# Patient Record
Sex: FEMALE | Race: WHITE | NOT HISPANIC OR LATINO | Employment: OTHER | URBAN - METROPOLITAN AREA
[De-identification: names, ages, dates, MRNs, and addresses within clinical notes are randomized per-mention and may not be internally consistent; named-entity substitution may affect disease eponyms.]

---

## 2023-12-08 DIAGNOSIS — M25.551 PAIN IN RIGHT HIP: ICD-10-CM

## 2023-12-08 DIAGNOSIS — E55.9 VITAMIN D DEFICIENCY, UNSPECIFIED: Primary | ICD-10-CM

## 2023-12-12 ENCOUNTER — LAB (OUTPATIENT)
Dept: LAB | Facility: LAB | Age: 73
End: 2023-12-12
Payer: MEDICARE

## 2023-12-12 DIAGNOSIS — M25.551 PAIN IN RIGHT HIP: ICD-10-CM

## 2023-12-12 DIAGNOSIS — E55.9 VITAMIN D DEFICIENCY, UNSPECIFIED: ICD-10-CM

## 2023-12-12 DIAGNOSIS — E78.5 HYPERLIPIDEMIA, UNSPECIFIED: ICD-10-CM

## 2023-12-12 DIAGNOSIS — I34.1 NONRHEUMATIC MITRAL (VALVE) PROLAPSE: ICD-10-CM

## 2023-12-12 DIAGNOSIS — I25.10 ATHEROSCLEROTIC HEART DISEASE OF NATIVE CORONARY ARTERY WITHOUT ANGINA PECTORIS: Primary | ICD-10-CM

## 2023-12-12 LAB
ANION GAP SERPL CALC-SCNC: 15 MMOL/L (ref 10–20)
BUN SERPL-MCNC: 18 MG/DL (ref 6–23)
CALCIUM SERPL-MCNC: 9.7 MG/DL (ref 8.6–10.3)
CHLORIDE SERPL-SCNC: 95 MMOL/L (ref 98–107)
CHOLEST SERPL-MCNC: 149 MG/DL (ref 0–199)
CHOLESTEROL/HDL RATIO: 2.7
CO2 SERPL-SCNC: 30 MMOL/L (ref 21–32)
CREAT SERPL-MCNC: 0.65 MG/DL (ref 0.5–1.05)
GFR SERPL CREATININE-BSD FRML MDRD: >90 ML/MIN/1.73M*2
GLUCOSE SERPL-MCNC: 66 MG/DL (ref 74–99)
HDLC SERPL-MCNC: 56.2 MG/DL
LDLC SERPL CALC-MCNC: 70 MG/DL
MAGNESIUM SERPL-MCNC: 1.81 MG/DL (ref 1.6–2.4)
NON HDL CHOLESTEROL: 93 MG/DL (ref 0–149)
POTASSIUM SERPL-SCNC: 3.8 MMOL/L (ref 3.5–5.3)
SODIUM SERPL-SCNC: 136 MMOL/L (ref 136–145)
TRIGL SERPL-MCNC: 116 MG/DL (ref 0–149)
VLDL: 23 MG/DL (ref 0–40)

## 2023-12-12 PROCEDURE — 80061 LIPID PANEL: CPT

## 2023-12-12 PROCEDURE — 82306 VITAMIN D 25 HYDROXY: CPT

## 2023-12-12 PROCEDURE — 83735 ASSAY OF MAGNESIUM: CPT

## 2023-12-12 PROCEDURE — 80048 BASIC METABOLIC PNL TOTAL CA: CPT

## 2023-12-12 PROCEDURE — 36415 COLL VENOUS BLD VENIPUNCTURE: CPT

## 2023-12-13 LAB — 25(OH)D3 SERPL-MCNC: 71 NG/ML (ref 30–100)

## 2024-01-04 ENCOUNTER — ANCILLARY PROCEDURE (OUTPATIENT)
Dept: RADIOLOGY | Facility: CLINIC | Age: 74
End: 2024-01-04
Payer: MEDICARE

## 2024-01-04 ENCOUNTER — LAB (OUTPATIENT)
Dept: LAB | Facility: LAB | Age: 74
End: 2024-01-04
Payer: MEDICARE

## 2024-01-04 DIAGNOSIS — R53.83 OTHER FATIGUE: ICD-10-CM

## 2024-01-04 DIAGNOSIS — R06.02 SHORTNESS OF BREATH: ICD-10-CM

## 2024-01-04 DIAGNOSIS — Z87.440 PERSONAL HISTORY OF URINARY (TRACT) INFECTIONS: Primary | ICD-10-CM

## 2024-01-04 LAB
APPEARANCE UR: ABNORMAL
BACTERIA #/AREA URNS AUTO: ABNORMAL /HPF
BASOPHILS # BLD AUTO: 0.09 X10*3/UL (ref 0–0.1)
BASOPHILS NFR BLD AUTO: 0.7 %
BILIRUB UR STRIP.AUTO-MCNC: NEGATIVE MG/DL
CAOX CRY #/AREA UR COMP ASSIST: ABNORMAL /HPF
COLOR UR: ABNORMAL
EOSINOPHIL # BLD AUTO: 0.43 X10*3/UL (ref 0–0.4)
EOSINOPHIL NFR BLD AUTO: 3.5 %
ERYTHROCYTE [DISTWIDTH] IN BLOOD BY AUTOMATED COUNT: 13 % (ref 11.5–14.5)
GLUCOSE UR STRIP.AUTO-MCNC: NEGATIVE MG/DL
HCT VFR BLD AUTO: 50.8 % (ref 36–46)
HGB BLD-MCNC: 16.3 G/DL (ref 12–16)
IMM GRANULOCYTES # BLD AUTO: 0.04 X10*3/UL (ref 0–0.5)
IMM GRANULOCYTES NFR BLD AUTO: 0.3 % (ref 0–0.9)
KETONES UR STRIP.AUTO-MCNC: NEGATIVE MG/DL
LEUKOCYTE ESTERASE UR QL STRIP.AUTO: ABNORMAL
LYMPHOCYTES # BLD AUTO: 1.86 X10*3/UL (ref 0.8–3)
LYMPHOCYTES NFR BLD AUTO: 15.1 %
MCH RBC QN AUTO: 30.9 PG (ref 26–34)
MCHC RBC AUTO-ENTMCNC: 32.1 G/DL (ref 32–36)
MCV RBC AUTO: 96 FL (ref 80–100)
MONOCYTES # BLD AUTO: 1.06 X10*3/UL (ref 0.05–0.8)
MONOCYTES NFR BLD AUTO: 8.6 %
MUCOUS THREADS #/AREA URNS AUTO: ABNORMAL /LPF
NEUTROPHILS # BLD AUTO: 8.87 X10*3/UL (ref 1.6–5.5)
NEUTROPHILS NFR BLD AUTO: 71.8 %
NITRITE UR QL STRIP.AUTO: NEGATIVE
NRBC BLD-RTO: 0 /100 WBCS (ref 0–0)
PH UR STRIP.AUTO: 5 [PH]
PLATELET # BLD AUTO: 321 X10*3/UL (ref 150–450)
PROT UR STRIP.AUTO-MCNC: NEGATIVE MG/DL
RBC # BLD AUTO: 5.27 X10*6/UL (ref 4–5.2)
RBC # UR STRIP.AUTO: NEGATIVE /UL
RBC #/AREA URNS AUTO: ABNORMAL /HPF
SP GR UR STRIP.AUTO: 1.02
UROBILINOGEN UR STRIP.AUTO-MCNC: <2 MG/DL
WBC # BLD AUTO: 12.4 X10*3/UL (ref 4.4–11.3)
WBC #/AREA URNS AUTO: ABNORMAL /HPF

## 2024-01-04 PROCEDURE — 81001 URINALYSIS AUTO W/SCOPE: CPT

## 2024-01-04 PROCEDURE — 84443 ASSAY THYROID STIM HORMONE: CPT

## 2024-01-04 PROCEDURE — 85025 COMPLETE CBC W/AUTO DIFF WBC: CPT

## 2024-01-04 PROCEDURE — 71046 X-RAY EXAM CHEST 2 VIEWS: CPT

## 2024-01-04 PROCEDURE — 36415 COLL VENOUS BLD VENIPUNCTURE: CPT

## 2024-01-04 PROCEDURE — 71046 X-RAY EXAM CHEST 2 VIEWS: CPT | Performed by: RADIOLOGY

## 2024-01-04 PROCEDURE — 80053 COMPREHEN METABOLIC PANEL: CPT

## 2024-01-05 LAB
ALBUMIN SERPL BCP-MCNC: 4 G/DL (ref 3.4–5)
ALP SERPL-CCNC: 63 U/L (ref 33–136)
ALT SERPL W P-5'-P-CCNC: 25 U/L (ref 7–45)
ANION GAP SERPL CALC-SCNC: 15 MMOL/L (ref 10–20)
AST SERPL W P-5'-P-CCNC: 25 U/L (ref 9–39)
BILIRUB SERPL-MCNC: 1.1 MG/DL (ref 0–1.2)
BUN SERPL-MCNC: 18 MG/DL (ref 6–23)
CALCIUM SERPL-MCNC: 9.3 MG/DL (ref 8.6–10.3)
CHLORIDE SERPL-SCNC: 101 MMOL/L (ref 98–107)
CO2 SERPL-SCNC: 30 MMOL/L (ref 21–32)
CREAT SERPL-MCNC: 0.74 MG/DL (ref 0.5–1.05)
GFR SERPL CREATININE-BSD FRML MDRD: 86 ML/MIN/1.73M*2
GLUCOSE SERPL-MCNC: 134 MG/DL (ref 74–99)
POTASSIUM SERPL-SCNC: 4.2 MMOL/L (ref 3.5–5.3)
PROT SERPL-MCNC: 6 G/DL (ref 6.4–8.2)
SODIUM SERPL-SCNC: 142 MMOL/L (ref 136–145)
TSH SERPL-ACNC: 2.26 MIU/L (ref 0.44–3.98)

## 2024-01-08 ENCOUNTER — HOSPITAL ENCOUNTER (OUTPATIENT)
Dept: CARDIOLOGY | Facility: HOSPITAL | Age: 74
Discharge: HOME | End: 2024-01-08
Payer: MEDICARE

## 2024-01-08 ENCOUNTER — HOSPITAL ENCOUNTER (EMERGENCY)
Facility: HOSPITAL | Age: 74
Discharge: HOME | End: 2024-01-08
Attending: EMERGENCY MEDICINE
Payer: MEDICARE

## 2024-01-08 ENCOUNTER — APPOINTMENT (OUTPATIENT)
Dept: RADIOLOGY | Facility: HOSPITAL | Age: 74
End: 2024-01-08
Payer: MEDICARE

## 2024-01-08 VITALS
SYSTOLIC BLOOD PRESSURE: 109 MMHG | RESPIRATION RATE: 20 BRPM | WEIGHT: 136 LBS | OXYGEN SATURATION: 94 % | HEART RATE: 90 BPM | HEIGHT: 62 IN | BODY MASS INDEX: 25.03 KG/M2 | TEMPERATURE: 100.2 F | DIASTOLIC BLOOD PRESSURE: 74 MMHG

## 2024-01-08 DIAGNOSIS — J18.9 ATYPICAL PNEUMONIA: ICD-10-CM

## 2024-01-08 DIAGNOSIS — J44.1 COPD EXACERBATION (MULTI): ICD-10-CM

## 2024-01-08 DIAGNOSIS — R06.02 SHORTNESS OF BREATH: Primary | ICD-10-CM

## 2024-01-08 LAB
ALBUMIN SERPL-MCNC: 4.2 G/DL (ref 3.5–5)
ALP BLD-CCNC: 71 U/L (ref 35–125)
ALT SERPL-CCNC: 23 U/L (ref 5–40)
ANION GAP SERPL CALC-SCNC: 11 MMOL/L
AST SERPL-CCNC: 30 U/L (ref 5–40)
BASOPHILS # BLD AUTO: 0.03 X10*3/UL (ref 0–0.1)
BASOPHILS NFR BLD AUTO: 0.4 %
BILIRUB SERPL-MCNC: 1.2 MG/DL (ref 0.1–1.2)
BUN SERPL-MCNC: 14 MG/DL (ref 8–25)
CALCIUM SERPL-MCNC: 9.6 MG/DL (ref 8.5–10.4)
CHLORIDE SERPL-SCNC: 97 MMOL/L (ref 97–107)
CO2 SERPL-SCNC: 28 MMOL/L (ref 24–31)
CREAT SERPL-MCNC: 0.7 MG/DL (ref 0.4–1.6)
EGFRCR SERPLBLD CKD-EPI 2021: >90 ML/MIN/1.73M*2
EOSINOPHIL # BLD AUTO: 0.12 X10*3/UL (ref 0–0.4)
EOSINOPHIL NFR BLD AUTO: 1.8 %
ERYTHROCYTE [DISTWIDTH] IN BLOOD BY AUTOMATED COUNT: 13.5 % (ref 11.5–14.5)
FLUAV RNA RESP QL NAA+PROBE: NOT DETECTED
FLUBV RNA RESP QL NAA+PROBE: NOT DETECTED
GLUCOSE SERPL-MCNC: 108 MG/DL (ref 65–99)
HCT VFR BLD AUTO: 48.5 % (ref 36–46)
HGB BLD-MCNC: 15.9 G/DL (ref 12–16)
IMM GRANULOCYTES # BLD AUTO: 0.02 X10*3/UL (ref 0–0.5)
IMM GRANULOCYTES NFR BLD AUTO: 0.3 % (ref 0–0.9)
LYMPHOCYTES # BLD AUTO: 0.52 X10*3/UL (ref 0.8–3)
LYMPHOCYTES NFR BLD AUTO: 7.7 %
MCH RBC QN AUTO: 30.8 PG (ref 26–34)
MCHC RBC AUTO-ENTMCNC: 32.8 G/DL (ref 32–36)
MCV RBC AUTO: 94 FL (ref 80–100)
MONOCYTES # BLD AUTO: 1.04 X10*3/UL (ref 0.05–0.8)
MONOCYTES NFR BLD AUTO: 15.4 %
NEUTROPHILS # BLD AUTO: 5.04 X10*3/UL (ref 1.6–5.5)
NEUTROPHILS NFR BLD AUTO: 74.4 %
NRBC BLD-RTO: 0 /100 WBCS (ref 0–0)
NT-PROBNP SERPL-MCNC: 122 PG/ML (ref 0–353)
PLATELET # BLD AUTO: 242 X10*3/UL (ref 150–450)
POTASSIUM SERPL-SCNC: 3.8 MMOL/L (ref 3.4–5.1)
PROT SERPL-MCNC: 6.7 G/DL (ref 5.9–7.9)
RBC # BLD AUTO: 5.16 X10*6/UL (ref 4–5.2)
RSV RNA RESP QL NAA+PROBE: NOT DETECTED
SARS-COV-2 RNA RESP QL NAA+PROBE: NOT DETECTED
SODIUM SERPL-SCNC: 136 MMOL/L (ref 133–145)
TROPONIN T SERPL-MCNC: 10 NG/L
TROPONIN T SERPL-MCNC: 9 NG/L
WBC # BLD AUTO: 6.8 X10*3/UL (ref 4.4–11.3)

## 2024-01-08 PROCEDURE — 36415 COLL VENOUS BLD VENIPUNCTURE: CPT | Performed by: PHYSICIAN ASSISTANT

## 2024-01-08 PROCEDURE — 71046 X-RAY EXAM CHEST 2 VIEWS: CPT

## 2024-01-08 PROCEDURE — 80053 COMPREHEN METABOLIC PANEL: CPT | Performed by: PHYSICIAN ASSISTANT

## 2024-01-08 PROCEDURE — 85025 COMPLETE CBC W/AUTO DIFF WBC: CPT | Performed by: PHYSICIAN ASSISTANT

## 2024-01-08 PROCEDURE — 94640 AIRWAY INHALATION TREATMENT: CPT | Mod: 59

## 2024-01-08 PROCEDURE — 84484 ASSAY OF TROPONIN QUANT: CPT | Performed by: PHYSICIAN ASSISTANT

## 2024-01-08 PROCEDURE — 83880 ASSAY OF NATRIURETIC PEPTIDE: CPT | Performed by: PHYSICIAN ASSISTANT

## 2024-01-08 PROCEDURE — 99284 EMERGENCY DEPT VISIT MOD MDM: CPT | Performed by: EMERGENCY MEDICINE

## 2024-01-08 PROCEDURE — 96374 THER/PROPH/DIAG INJ IV PUSH: CPT | Performed by: EMERGENCY MEDICINE

## 2024-01-08 PROCEDURE — 2500000002 HC RX 250 W HCPCS SELF ADMINISTERED DRUGS (ALT 637 FOR MEDICARE OP, ALT 636 FOR OP/ED): Performed by: PHYSICIAN ASSISTANT

## 2024-01-08 PROCEDURE — 87637 SARSCOV2&INF A&B&RSV AMP PRB: CPT | Performed by: PHYSICIAN ASSISTANT

## 2024-01-08 PROCEDURE — 2500000004 HC RX 250 GENERAL PHARMACY W/ HCPCS (ALT 636 FOR OP/ED): Performed by: PHYSICIAN ASSISTANT

## 2024-01-08 PROCEDURE — 93005 ELECTROCARDIOGRAM TRACING: CPT

## 2024-01-08 RX ORDER — ACETAMINOPHEN 325 MG/1
650 TABLET ORAL ONCE
Status: COMPLETED | OUTPATIENT
Start: 2024-01-08 | End: 2024-01-08

## 2024-01-08 RX ORDER — AZITHROMYCIN 250 MG/1
250 TABLET, FILM COATED ORAL DAILY
Qty: 6 TABLET | Refills: 0 | Status: SHIPPED | OUTPATIENT
Start: 2024-01-08 | End: 2024-01-13

## 2024-01-08 RX ORDER — METHYLPREDNISOLONE 4 MG/1
TABLET ORAL
Qty: 21 TABLET | Refills: 0 | Status: SHIPPED | OUTPATIENT
Start: 2024-01-08 | End: 2024-01-15

## 2024-01-08 RX ORDER — ALBUTEROL SULFATE 90 UG/1
2 AEROSOL, METERED RESPIRATORY (INHALATION) EVERY 4 HOURS PRN
Qty: 18 G | Refills: 0 | Status: SHIPPED | OUTPATIENT
Start: 2024-01-08 | End: 2024-02-27

## 2024-01-08 RX ORDER — ALBUTEROL SULFATE 90 UG/1
2 AEROSOL, METERED RESPIRATORY (INHALATION) EVERY 4 HOURS PRN
Qty: 18 G | Refills: 0 | Status: SHIPPED | OUTPATIENT
Start: 2024-01-08 | End: 2024-02-07

## 2024-01-08 RX ORDER — IPRATROPIUM BROMIDE AND ALBUTEROL SULFATE 2.5; .5 MG/3ML; MG/3ML
3 SOLUTION RESPIRATORY (INHALATION) ONCE
Status: COMPLETED | OUTPATIENT
Start: 2024-01-08 | End: 2024-01-08

## 2024-01-08 RX ADMIN — IPRATROPIUM BROMIDE AND ALBUTEROL SULFATE 3 ML: 2.5; .5 SOLUTION RESPIRATORY (INHALATION) at 14:43

## 2024-01-08 RX ADMIN — METHYLPREDNISOLONE SODIUM SUCCINATE 125 MG: 125 INJECTION, POWDER, FOR SOLUTION INTRAMUSCULAR; INTRAVENOUS at 14:39

## 2024-01-08 RX ADMIN — ACETAMINOPHEN 650 MG: 325 TABLET ORAL at 14:39

## 2024-01-08 ASSESSMENT — HEART SCORE
RISK FACTORS: >2 RISK FACTORS OR HX OF ATHEROSCLEROTIC DISEASE
HEART SCORE: 4
ECG: NORMAL
HISTORY: SLIGHTLY SUSPICIOUS
AGE: 65+
TROPONIN: LESS THAN OR EQUAL TO NORMAL LIMIT

## 2024-01-08 ASSESSMENT — COLUMBIA-SUICIDE SEVERITY RATING SCALE - C-SSRS
2. HAVE YOU ACTUALLY HAD ANY THOUGHTS OF KILLING YOURSELF?: NO
6. HAVE YOU EVER DONE ANYTHING, STARTED TO DO ANYTHING, OR PREPARED TO DO ANYTHING TO END YOUR LIFE?: NO
1. IN THE PAST MONTH, HAVE YOU WISHED YOU WERE DEAD OR WISHED YOU COULD GO TO SLEEP AND NOT WAKE UP?: NO

## 2024-01-08 ASSESSMENT — PAIN - FUNCTIONAL ASSESSMENT: PAIN_FUNCTIONAL_ASSESSMENT: 0-10

## 2024-01-08 NOTE — ED PROVIDER NOTES
HPI   Chief Complaint   Patient presents with    Shortness of Breath     2 night before I started having cramping under my lt breast and I became more sob        73-year-old female presented emergency department with a chief complaint of left-sided chest discomfort, shortness of breath, productive cough.  Symptoms have been over a month but acutely worse in the last several days.  At the beginning of December she was on steroids and antibiotics.  She subsequently had some improvement but has now worsened.  She does have a history of coronary artery disease follows with Dr. Reaves.  Nontoxic-appearing.  Patient is febrile on arrival.  No other complaint.                          [place] Coma Scale Score: 15                  Patient History   No past medical history on file.  Past Surgical History:   Procedure Laterality Date    OTHER SURGICAL HISTORY  2022    Appendectomy    OTHER SURGICAL HISTORY  2022     section    OTHER SURGICAL HISTORY  2022    Hernia repair    OTHER SURGICAL HISTORY  2022    Wrist surgery     No family history on file.  Social History     Tobacco Use    Smoking status: Not on file    Smokeless tobacco: Not on file   Substance Use Topics    Alcohol use: Not on file    Drug use: Not on file       Physical Exam   ED Triage Vitals [24 1402]   Temp Heart Rate Resp BP   37.9 °C (100.2 °F) 90 20 109/74      SpO2 Temp Source Heart Rate Source Patient Position   94 % Oral Monitor Sitting      BP Location FiO2 (%)     Left arm --       Physical Exam  Vitals and nursing note reviewed.   Constitutional:       Appearance: She is well-developed.   HENT:      Head: Normocephalic and atraumatic.   Cardiovascular:      Rate and Rhythm: Normal rate and regular rhythm.   Pulmonary:      Comments: Crackles left lower lobe, diffuse wheeze  Musculoskeletal:         General: Normal range of motion.      Cervical back: Normal range of motion.   Skin:     General: Skin is  dry.   Neurological:      General: No focal deficit present.      Mental Status: She is alert.   Psychiatric:         Mood and Affect: Mood normal.         ED Course & MDM   Diagnoses as of 01/08/24 1831   Shortness of breath   COPD exacerbation (CMS/[facility])   Atypical pneumonia       Medical Decision Making  I have seen and evaluated this patient.  The attending physician has also seen and evaluated this patient.  Vital signs, laboratory testing and diagnostic images if applicable have been reviewed.  All laboratory and imaging is interpreted by myself unless otherwise stated.  Radiology studies are also formally interpreted by radiologist.    CBC without significant leukocytosis or anemia, metabolic panel without significant renal impairment or electrolyte abnormality.  Troponin within an appropriate range without significant delta change, chest x-ray without actionable cardiopulmonary process, EKG without evidence of acute ischemia.  Patient does have an elevated heart score of 4.  Although her symptoms are somewhat atypical of acute coronary syndrome and most consistent with infectious etiology.  Patient given option of admission to the hospital.  Prefers outpatient treatment.  Will give cardiology pulmonology referral.  Started on antibiotic for atypical pneumonia and provide short course of steroid.  Released in good condition.    Labs Reviewed  COMPREHENSIVE METABOLIC PANEL - Abnormal     Glucose                       108 (*)                Sodium                        136                    Potassium                     3.8                    Chloride                      97                     Bicarbonate                   28                     Urea Nitrogen                 14                     Creatinine                    0.70                   eGFR                          >90                    Calcium                       9.6                    Albumin                       4.2                     Alkaline Phosphatase          71                     Total Protein                 6.7                    AST                           30                     Bilirubin, Total              1.2                    ALT                           23                     Anion Gap                     11                  CBC WITH AUTO DIFFERENTIAL - Abnormal     WBC                           6.8                    nRBC                          0.0                    RBC                           5.16                   Hemoglobin                    15.9                   Hematocrit                    48.5 (*)               MCV                           94                     MCH                           30.8                   MCHC                          32.8                   RDW                           13.5                   Platelets                     242                    Neutrophils %                 74.4                   Immature Granulocytes %, Automated   0.3                    Lymphocytes %                 7.7                    Monocytes %                   15.4                   Eosinophils %                 1.8                    Basophils %                   0.4                    Neutrophils Absolute          5.04                   Immature Granulocytes Absolute, Au*   0.02                   Lymphocytes Absolute          0.52 (*)               Monocytes Absolute            1.04 (*)               Eosinophils Absolute          0.12                   Basophils Absolute            0.03                N-TERMINAL PROBNP - Normal     PROBNP                        122                        Narrative: Reference ranges are based on clinical submission data. These ranges represent the 95th percentile of normal cut-off points. As NT Pro- BNP values approach 1000 pg/ml, clinical symptoms are more likely associated with CHF.  SARS-COV-2 AND INFLUENZA A/B PCR - Normal     Flu A Result                                          Flu B Result                                         Coronavirus 2019, PCR                                    Narrative: This assay has received FDA Emergency Use Authorization (EUA) and  is only authorized for the duration of time that circumstances exist to justify the authorization of the emergency use of in vitro diagnostic tests for the detection of SARS-CoV-2 virus and/or diagnosis of COVID-19 infection under section 564(b)(1) of the Act, 21 U.S.C. 360bbb-3(b)(1). Testing for SARS-CoV-2 is only recommended for patients who meet current clinical and/or epidemiological criteria as defined by federal, state, or local public health directives. This assay is an in vitro diagnostic nucleic acid amplification test for the qualitative detection of SARS-CoV-2, Influenza A, and Influenza B from nasopharyngeal specimens and has been validated for use at [facility]. Negative results do not preclude COVID-19 infections or Influenza A/B infections, and should not be used as the sole basis for diagnosis, treatment, or other management decisions. If Influenza A/B and RSV PCR results are negative, testing for Parainfluenza virus, Adenovirus and Metapneumovirus is routinely performed for [facility] pediatric oncology and intensive care inpatients, and is available on other patients by placing an add-on request.   RSV PCR - Normal     RSV PCR                                                  Narrative: This assay is an FDA-cleared, in vitro diagnostic nucleic acid amplification test for the detection of RSV from nasopharyngeal specimens, and has been validated for use at [facility]. Negative results do not preclude RSV infections, and should not be used as the sole basis for diagnosis, treatment, or other management decisions. If Influenza A/B and RSV PCR results are negative, testing for Parainfluenza virus, Adenovirus and Metapneumovirus is routinely performed for pediatric  oncology and intensive care inpatients at [facility], and is available on other patients by placing an add-on request.                                      SERIAL TROPONIN, INITIAL ([facility]) - Normal     Troponin T, High Sensitivity   10                  SERIAL TROPONIN,  2 HOUR ([facility]) - Normal     Troponin T, High Sensitivity   9                   TROPONIN T SERIES, HIGH SENSITIVITY (0, 2 HR, 6 HR)         Narrative: The following orders were created for panel order Troponin T Series, High Sensitivity (0, 2HR, 6HR).                  Procedure                               Abnormality         Status                                     ---------                               -----------         ------                                     Serial Troponin, Initial...[[id number]]  Normal              Final result                               Serial Troponin, 2 Hour ...[[id number]]  Normal              Final result                                                 Please view results for these tests on the individual orders.  URINALYSIS WITH REFLEX CULTURE AND MICROSCOPIC         Narrative: The following orders were created for panel order Urinalysis with Reflex Culture and Microscopic.                  Procedure                               Abnormality         Status                                     ---------                               -----------         ------                                     Urinalysis with Reflex C...[[id number]]                                                                 Extra Urine Gray Tube[[id number]]                                                                                         Please view results for these tests on the individual orders.  URINALYSIS WITH REFLEX CULTURE AND MICROSCOPIC  EXTRA URINE GRAY TUBE    XR chest 2 views   Final Result    Hyperinflation. No acute cardiopulmonary process. No significant    interval change from 01/04/2024                Signed by: Chivo Frazier  1/8/2024 4:20 PM    Dictation workstation:   [id number]        Your medication list      START taking these medications      Instructions Last Dose Given Next Dose Due   albuterol 90 mcg/actuation inhaler      Inhale 2 puffs every 4 hours if needed for wheezing.       azithromycin 250 mg tablet  Commonly known as: Zithromax Z-Kvng      Take 1 tablet (250 mg) by mouth once daily for 5 days.       methylPREDNISolone 4 mg tablets  Commonly known as: Medrol Dospak      Follow schedule on package instructions             Where to Get Your Medications      These medications were sent to [facility]/pharmacy #[id number] - [place], [place] - [address]   [facility] AT [address], [place] [id number]    Phone: [phone]   albuterol 90 mcg/actuation inhaler  azithromycin 250 mg tablet  methylPREDNISolone 4 mg tablets             Procedure  Procedures     Damian Cotton PA-C  01/08/24 1723

## 2024-01-24 ENCOUNTER — LAB (OUTPATIENT)
Dept: LAB | Facility: LAB | Age: 74
End: 2024-01-24
Payer: MEDICARE

## 2024-01-24 DIAGNOSIS — J20.8 ACUTE BRONCHITIS DUE TO OTHER SPECIFIED ORGANISMS: ICD-10-CM

## 2024-01-24 DIAGNOSIS — J20.8 ACUTE BRONCHITIS DUE TO OTHER SPECIFIED ORGANISMS: Primary | ICD-10-CM

## 2024-01-24 LAB
BACTERIA SPEC RESP CULT: ABNORMAL
BASOPHILS # BLD AUTO: 0.06 X10*3/UL (ref 0–0.1)
BASOPHILS NFR BLD AUTO: 0.7 %
EOSINOPHIL # BLD AUTO: 0.23 X10*3/UL (ref 0–0.4)
EOSINOPHIL NFR BLD AUTO: 2.7 %
ERYTHROCYTE [DISTWIDTH] IN BLOOD BY AUTOMATED COUNT: 13.7 % (ref 11.5–14.5)
GRAM STN SPEC: ABNORMAL
HCT VFR BLD AUTO: 48.5 % (ref 36–46)
HGB BLD-MCNC: 15.7 G/DL (ref 12–16)
IMM GRANULOCYTES # BLD AUTO: 0.03 X10*3/UL (ref 0–0.5)
IMM GRANULOCYTES NFR BLD AUTO: 0.4 % (ref 0–0.9)
LYMPHOCYTES # BLD AUTO: 1.63 X10*3/UL (ref 0.8–3)
LYMPHOCYTES NFR BLD AUTO: 19.2 %
MCH RBC QN AUTO: 30.8 PG (ref 26–34)
MCHC RBC AUTO-ENTMCNC: 32.4 G/DL (ref 32–36)
MCV RBC AUTO: 95 FL (ref 80–100)
MONOCYTES # BLD AUTO: 0.76 X10*3/UL (ref 0.05–0.8)
MONOCYTES NFR BLD AUTO: 9 %
NEUTROPHILS # BLD AUTO: 5.78 X10*3/UL (ref 1.6–5.5)
NEUTROPHILS NFR BLD AUTO: 68 %
NRBC BLD-RTO: 0 /100 WBCS (ref 0–0)
PLATELET # BLD AUTO: 349 X10*3/UL (ref 150–450)
PROCALCITONIN SERPL-MCNC: 0.02 NG/ML
RBC # BLD AUTO: 5.09 X10*6/UL (ref 4–5.2)
WBC # BLD AUTO: 8.5 X10*3/UL (ref 4.4–11.3)

## 2024-01-24 PROCEDURE — 87205 SMEAR GRAM STAIN: CPT | Mod: WESLAB | Performed by: HOSPITALIST

## 2024-01-24 PROCEDURE — 85025 COMPLETE CBC W/AUTO DIFF WBC: CPT

## 2024-01-24 PROCEDURE — 36415 COLL VENOUS BLD VENIPUNCTURE: CPT

## 2024-01-24 PROCEDURE — 84145 PROCALCITONIN (PCT): CPT

## 2024-02-20 LAB
ATRIAL RATE: 103 BPM
P AXIS: 76 DEGREES
P OFFSET: 197 MS
P ONSET: 147 MS
PR INTERVAL: 156 MS
Q ONSET: 225 MS
QRS COUNT: 16 BEATS
QRS DURATION: 82 MS
QT INTERVAL: 330 MS
QTC CALCULATION(BAZETT): 432 MS
QTC FREDERICIA: 395 MS
R AXIS: 80 DEGREES
T AXIS: 79 DEGREES
T OFFSET: 390 MS
VENTRICULAR RATE: 103 BPM

## 2024-02-22 ENCOUNTER — LAB (OUTPATIENT)
Dept: LAB | Facility: LAB | Age: 74
End: 2024-02-22
Payer: MEDICARE

## 2024-02-22 DIAGNOSIS — J44.9 CHRONIC OBSTRUCTIVE PULMONARY DISEASE, UNSPECIFIED (MULTI): ICD-10-CM

## 2024-02-22 DIAGNOSIS — I25.10 ATHEROSCLEROTIC HEART DISEASE OF NATIVE CORONARY ARTERY WITHOUT ANGINA PECTORIS: Primary | ICD-10-CM

## 2024-02-22 DIAGNOSIS — R00.1 BRADYCARDIA, UNSPECIFIED: ICD-10-CM

## 2024-02-22 LAB
ALBUMIN SERPL BCP-MCNC: 4.4 G/DL (ref 3.4–5)
ALP SERPL-CCNC: 60 U/L (ref 33–136)
ALT SERPL W P-5'-P-CCNC: 23 U/L (ref 7–45)
ANION GAP SERPL CALC-SCNC: 18 MMOL/L (ref 10–20)
AST SERPL W P-5'-P-CCNC: 26 U/L (ref 9–39)
BASOPHILS # BLD AUTO: 0.08 X10*3/UL (ref 0–0.1)
BASOPHILS NFR BLD AUTO: 0.9 %
BILIRUB SERPL-MCNC: 1.4 MG/DL (ref 0–1.2)
BUN SERPL-MCNC: 16 MG/DL (ref 6–23)
CALCIUM SERPL-MCNC: 9.4 MG/DL (ref 8.6–10.3)
CHLORIDE SERPL-SCNC: 100 MMOL/L (ref 98–107)
CO2 SERPL-SCNC: 31 MMOL/L (ref 21–32)
CREAT SERPL-MCNC: 0.66 MG/DL (ref 0.5–1.05)
EGFRCR SERPLBLD CKD-EPI 2021: >90 ML/MIN/1.73M*2
EOSINOPHIL # BLD AUTO: 0.22 X10*3/UL (ref 0–0.4)
EOSINOPHIL NFR BLD AUTO: 2.6 %
ERYTHROCYTE [DISTWIDTH] IN BLOOD BY AUTOMATED COUNT: 13.6 % (ref 11.5–14.5)
GLUCOSE SERPL-MCNC: 89 MG/DL (ref 74–99)
HCT VFR BLD AUTO: 50.8 % (ref 36–46)
HGB BLD-MCNC: 16.1 G/DL (ref 12–16)
IMM GRANULOCYTES # BLD AUTO: 0.02 X10*3/UL (ref 0–0.5)
IMM GRANULOCYTES NFR BLD AUTO: 0.2 % (ref 0–0.9)
LYMPHOCYTES # BLD AUTO: 2.07 X10*3/UL (ref 0.8–3)
LYMPHOCYTES NFR BLD AUTO: 24.4 %
MAGNESIUM SERPL-MCNC: 2.02 MG/DL (ref 1.6–2.4)
MCH RBC QN AUTO: 31 PG (ref 26–34)
MCHC RBC AUTO-ENTMCNC: 31.7 G/DL (ref 32–36)
MCV RBC AUTO: 98 FL (ref 80–100)
MONOCYTES # BLD AUTO: 0.63 X10*3/UL (ref 0.05–0.8)
MONOCYTES NFR BLD AUTO: 7.4 %
NEUTROPHILS # BLD AUTO: 5.47 X10*3/UL (ref 1.6–5.5)
NEUTROPHILS NFR BLD AUTO: 64.5 %
NRBC BLD-RTO: 0 /100 WBCS (ref 0–0)
PLATELET # BLD AUTO: 319 X10*3/UL (ref 150–450)
POTASSIUM SERPL-SCNC: 4.2 MMOL/L (ref 3.5–5.3)
PROT SERPL-MCNC: 6.6 G/DL (ref 6.4–8.2)
RBC # BLD AUTO: 5.2 X10*6/UL (ref 4–5.2)
SODIUM SERPL-SCNC: 145 MMOL/L (ref 136–145)
WBC # BLD AUTO: 8.5 X10*3/UL (ref 4.4–11.3)

## 2024-02-22 PROCEDURE — 82103 ALPHA-1-ANTITRYPSIN TOTAL: CPT

## 2024-02-22 PROCEDURE — 36415 COLL VENOUS BLD VENIPUNCTURE: CPT

## 2024-02-22 PROCEDURE — 80053 COMPREHEN METABOLIC PANEL: CPT

## 2024-02-22 PROCEDURE — 83735 ASSAY OF MAGNESIUM: CPT

## 2024-02-22 PROCEDURE — 85025 COMPLETE CBC W/AUTO DIFF WBC: CPT

## 2024-02-23 LAB — A1AT SERPL NEPH-MCNC: 161 MG/DL (ref 84–218)

## 2024-02-27 ENCOUNTER — APPOINTMENT (OUTPATIENT)
Dept: CARDIOLOGY | Facility: HOSPITAL | Age: 74
End: 2024-02-27
Payer: MEDICARE

## 2024-02-27 ENCOUNTER — HOSPITAL ENCOUNTER (OUTPATIENT)
Facility: HOSPITAL | Age: 74
Setting detail: OBSERVATION
Discharge: HOME | End: 2024-02-28
Attending: INTERNAL MEDICINE | Admitting: INTERNAL MEDICINE
Payer: MEDICARE

## 2024-02-27 DIAGNOSIS — R07.89 OTHER CHEST PAIN: Primary | ICD-10-CM

## 2024-02-27 DIAGNOSIS — Z03.89 CORONARY ARTERY DISEASE (CAD) EXCLUDED: ICD-10-CM

## 2024-02-27 DIAGNOSIS — I20.0 UNSTABLE ANGINA (MULTI): ICD-10-CM

## 2024-02-27 LAB
ACT BLD: 255 SEC (ref 89–169)
ACT BLD: 328 SEC (ref 89–169)

## 2024-02-27 PROCEDURE — 2500000002 HC RX 250 W HCPCS SELF ADMINISTERED DRUGS (ALT 637 FOR MEDICARE OP, ALT 636 FOR OP/ED): Mod: MUE | Performed by: INTERNAL MEDICINE

## 2024-02-27 PROCEDURE — C1725 CATH, TRANSLUMIN NON-LASER: HCPCS | Performed by: INTERNAL MEDICINE

## 2024-02-27 PROCEDURE — 85347 COAGULATION TIME ACTIVATED: CPT

## 2024-02-27 PROCEDURE — 2500000004 HC RX 250 GENERAL PHARMACY W/ HCPCS (ALT 636 FOR OP/ED): Performed by: INTERNAL MEDICINE

## 2024-02-27 PROCEDURE — 2550000001 HC RX 255 CONTRASTS: Performed by: INTERNAL MEDICINE

## 2024-02-27 PROCEDURE — 93458 L HRT ARTERY/VENTRICLE ANGIO: CPT | Mod: 59 | Performed by: INTERNAL MEDICINE

## 2024-02-27 PROCEDURE — 7100000010 HC PHASE TWO TIME - EACH INCREMENTAL 1 MINUTE: Performed by: INTERNAL MEDICINE

## 2024-02-27 PROCEDURE — 93010 ELECTROCARDIOGRAM REPORT: CPT | Performed by: INTERNAL MEDICINE

## 2024-02-27 PROCEDURE — G0269 OCCLUSIVE DEVICE IN VEIN ART: HCPCS | Mod: TC,59 | Performed by: INTERNAL MEDICINE

## 2024-02-27 PROCEDURE — 93005 ELECTROCARDIOGRAM TRACING: CPT

## 2024-02-27 PROCEDURE — C9600 PERC DRUG-EL COR STENT SING: HCPCS | Performed by: INTERNAL MEDICINE

## 2024-02-27 PROCEDURE — C1769 GUIDE WIRE: HCPCS | Performed by: INTERNAL MEDICINE

## 2024-02-27 PROCEDURE — C1760 CLOSURE DEV, VASC: HCPCS | Performed by: INTERNAL MEDICINE

## 2024-02-27 PROCEDURE — 99152 MOD SED SAME PHYS/QHP 5/>YRS: CPT | Performed by: INTERNAL MEDICINE

## 2024-02-27 PROCEDURE — C1887 CATHETER, GUIDING: HCPCS | Performed by: INTERNAL MEDICINE

## 2024-02-27 PROCEDURE — C1874 STENT, COATED/COV W/DEL SYS: HCPCS | Performed by: INTERNAL MEDICINE

## 2024-02-27 PROCEDURE — G0378 HOSPITAL OBSERVATION PER HR: HCPCS

## 2024-02-27 PROCEDURE — 2780000003 HC OR 278 NO HCPCS: Performed by: INTERNAL MEDICINE

## 2024-02-27 PROCEDURE — 2500000001 HC RX 250 WO HCPCS SELF ADMINISTERED DRUGS (ALT 637 FOR MEDICARE OP): Performed by: INTERNAL MEDICINE

## 2024-02-27 PROCEDURE — 99153 MOD SED SAME PHYS/QHP EA: CPT | Performed by: INTERNAL MEDICINE

## 2024-02-27 PROCEDURE — 2500000005 HC RX 250 GENERAL PHARMACY W/O HCPCS: Performed by: INTERNAL MEDICINE

## 2024-02-27 PROCEDURE — 7100000009 HC PHASE TWO TIME - INITIAL BASE CHARGE: Performed by: INTERNAL MEDICINE

## 2024-02-27 PROCEDURE — 2720000007 HC OR 272 NO HCPCS: Performed by: INTERNAL MEDICINE

## 2024-02-27 DEVICE — STENT ONYXNG30022UX ONYX 3.00X22RX
Type: IMPLANTABLE DEVICE | Site: HEART | Status: FUNCTIONAL
Brand: ONYX FRONTIER™

## 2024-02-27 RX ORDER — NITROGLYCERIN 0.3 MG/1
0.3 TABLET SUBLINGUAL EVERY 5 MIN PRN
COMMUNITY

## 2024-02-27 RX ORDER — ACETAMINOPHEN 325 MG/1
650 TABLET ORAL EVERY 6 HOURS PRN
Status: DISCONTINUED | OUTPATIENT
Start: 2024-02-27 | End: 2024-02-28 | Stop reason: HOSPADM

## 2024-02-27 RX ORDER — ATORVASTATIN CALCIUM 80 MG/1
80 TABLET, FILM COATED ORAL NIGHTLY
Status: DISCONTINUED | OUTPATIENT
Start: 2024-02-27 | End: 2024-02-28 | Stop reason: HOSPADM

## 2024-02-27 RX ORDER — NYSTATIN 100000 U/G
OINTMENT TOPICAL 2 TIMES DAILY
Status: DISCONTINUED | OUTPATIENT
Start: 2024-02-27 | End: 2024-02-28 | Stop reason: HOSPADM

## 2024-02-27 RX ORDER — LIDOCAINE HYDROCHLORIDE 10 MG/ML
INJECTION, SOLUTION EPIDURAL; INFILTRATION; INTRACAUDAL; PERINEURAL AS NEEDED
Status: DISCONTINUED | OUTPATIENT
Start: 2024-02-27 | End: 2024-02-27 | Stop reason: HOSPADM

## 2024-02-27 RX ORDER — LIDOCAINE HYDROCHLORIDE AND EPINEPHRINE 20; 10 MG/ML; UG/ML
3 INJECTION, SOLUTION INFILTRATION; PERINEURAL ONCE AS NEEDED
Status: DISCONTINUED | OUTPATIENT
Start: 2024-02-27 | End: 2024-02-28 | Stop reason: HOSPADM

## 2024-02-27 RX ORDER — SODIUM CHLORIDE 9 MG/ML
5 INJECTION, SOLUTION INTRAVENOUS CONTINUOUS
Status: DISCONTINUED | OUTPATIENT
Start: 2024-02-27 | End: 2024-02-27

## 2024-02-27 RX ORDER — IBANDRONATE SODIUM 150 MG/1
150 TABLET, FILM COATED ORAL
COMMUNITY

## 2024-02-27 RX ORDER — TRIAMTERENE/HYDROCHLOROTHIAZID 37.5-25 MG
1 TABLET ORAL DAILY
Status: DISCONTINUED | OUTPATIENT
Start: 2024-02-27 | End: 2024-02-28 | Stop reason: HOSPADM

## 2024-02-27 RX ORDER — NITROGLYCERIN 0.4 MG/1
0.4 TABLET SUBLINGUAL EVERY 5 MIN PRN
Status: DISCONTINUED | OUTPATIENT
Start: 2024-02-27 | End: 2024-02-28 | Stop reason: HOSPADM

## 2024-02-27 RX ORDER — PROMETHAZINE HYDROCHLORIDE 25 MG/1
25 TABLET ORAL EVERY 6 HOURS PRN
COMMUNITY

## 2024-02-27 RX ORDER — ATORVASTATIN CALCIUM 40 MG/1
40 TABLET, FILM COATED ORAL DAILY
COMMUNITY

## 2024-02-27 RX ORDER — PANTOPRAZOLE SODIUM 20 MG/1
20 TABLET, DELAYED RELEASE ORAL
Status: DISCONTINUED | OUTPATIENT
Start: 2024-02-28 | End: 2024-02-28 | Stop reason: HOSPADM

## 2024-02-27 RX ORDER — NITROGLYCERIN 40 MG/100ML
INJECTION INTRAVENOUS AS NEEDED
Status: DISCONTINUED | OUTPATIENT
Start: 2024-02-27 | End: 2024-02-27 | Stop reason: HOSPADM

## 2024-02-27 RX ORDER — ASPIRIN 81 MG/1
81 TABLET ORAL DAILY
Status: DISCONTINUED | OUTPATIENT
Start: 2024-02-27 | End: 2024-02-27

## 2024-02-27 RX ORDER — DEXTROSE MONOHYDRATE AND SODIUM CHLORIDE 5; .45 G/100ML; G/100ML
100 INJECTION, SOLUTION INTRAVENOUS CONTINUOUS
Status: DISCONTINUED | OUTPATIENT
Start: 2024-02-27 | End: 2024-02-28 | Stop reason: HOSPADM

## 2024-02-27 RX ORDER — ALBUTEROL SULFATE 0.83 MG/ML
3 SOLUTION RESPIRATORY (INHALATION) EVERY 4 HOURS PRN
Status: DISCONTINUED | OUTPATIENT
Start: 2024-02-27 | End: 2024-02-28 | Stop reason: HOSPADM

## 2024-02-27 RX ORDER — PROMETHAZINE HYDROCHLORIDE 25 MG/1
25 TABLET ORAL EVERY 6 HOURS PRN
Status: DISCONTINUED | OUTPATIENT
Start: 2024-02-27 | End: 2024-02-28 | Stop reason: HOSPADM

## 2024-02-27 RX ORDER — NYSTATIN 100000 U/G
OINTMENT TOPICAL 2 TIMES DAILY
COMMUNITY

## 2024-02-27 RX ORDER — MIDAZOLAM HYDROCHLORIDE 1 MG/ML
INJECTION, SOLUTION INTRAMUSCULAR; INTRAVENOUS AS NEEDED
Status: DISCONTINUED | OUTPATIENT
Start: 2024-02-27 | End: 2024-02-27 | Stop reason: HOSPADM

## 2024-02-27 RX ORDER — PROMETHAZINE HYDROCHLORIDE 25 MG/ML
INJECTION, SOLUTION INTRAMUSCULAR; INTRAVENOUS AS NEEDED
Status: DISCONTINUED | OUTPATIENT
Start: 2024-02-27 | End: 2024-02-27 | Stop reason: HOSPADM

## 2024-02-27 RX ORDER — FLUTICASONE PROPIONATE 50 MCG
1 SPRAY, SUSPENSION (ML) NASAL DAILY
COMMUNITY

## 2024-02-27 RX ORDER — ASPIRIN 81 MG/1
81 TABLET ORAL DAILY
COMMUNITY

## 2024-02-27 RX ORDER — CALCIUM CARBONATE 300MG(750)
400 TABLET,CHEWABLE ORAL DAILY
COMMUNITY

## 2024-02-27 RX ORDER — ESTRADIOL 0.1 MG/G
2 CREAM VAGINAL DAILY
Status: DISCONTINUED | OUTPATIENT
Start: 2024-02-27 | End: 2024-02-28 | Stop reason: HOSPADM

## 2024-02-27 RX ORDER — ERGOCALCIFEROL 1.25 MG/1
1250 CAPSULE ORAL
Status: DISCONTINUED | OUTPATIENT
Start: 2024-02-28 | End: 2024-02-28

## 2024-02-27 RX ORDER — METOPROLOL SUCCINATE 25 MG/1
25 TABLET, EXTENDED RELEASE ORAL DAILY
Status: DISCONTINUED | OUTPATIENT
Start: 2024-02-27 | End: 2024-02-28 | Stop reason: HOSPADM

## 2024-02-27 RX ORDER — ERGOCALCIFEROL 1.25 MG/1
1.25 CAPSULE ORAL
COMMUNITY

## 2024-02-27 RX ORDER — FENTANYL CITRATE 50 UG/ML
INJECTION, SOLUTION INTRAMUSCULAR; INTRAVENOUS AS NEEDED
Status: DISCONTINUED | OUTPATIENT
Start: 2024-02-27 | End: 2024-02-27 | Stop reason: HOSPADM

## 2024-02-27 RX ORDER — FLUTICASONE PROPIONATE 50 MCG
1 SPRAY, SUSPENSION (ML) NASAL DAILY
Status: DISCONTINUED | OUTPATIENT
Start: 2024-02-27 | End: 2024-02-28 | Stop reason: HOSPADM

## 2024-02-27 RX ORDER — NAPROXEN SODIUM 220 MG/1
81 TABLET, FILM COATED ORAL DAILY
Status: DISCONTINUED | OUTPATIENT
Start: 2024-02-27 | End: 2024-02-28 | Stop reason: HOSPADM

## 2024-02-27 RX ORDER — IODIXANOL 320 MG/ML
INJECTION, SOLUTION INTRAVASCULAR AS NEEDED
Status: DISCONTINUED | OUTPATIENT
Start: 2024-02-27 | End: 2024-02-27 | Stop reason: HOSPADM

## 2024-02-27 RX ORDER — ESTRADIOL 0.1 MG/G
2 CREAM VAGINAL DAILY
COMMUNITY

## 2024-02-27 RX ORDER — TRIAMTERENE AND HYDROCHLOROTHIAZIDE 37.5; 25 MG/1; MG/1
1 CAPSULE ORAL EVERY MORNING
COMMUNITY

## 2024-02-27 RX ORDER — PANTOPRAZOLE SODIUM 20 MG/1
20 TABLET, DELAYED RELEASE ORAL
COMMUNITY

## 2024-02-27 RX ORDER — ATORVASTATIN CALCIUM 40 MG/1
40 TABLET, FILM COATED ORAL NIGHTLY
Status: DISCONTINUED | OUTPATIENT
Start: 2024-02-27 | End: 2024-02-28

## 2024-02-27 RX ORDER — HEPARIN SODIUM 1000 [USP'U]/ML
INJECTION, SOLUTION INTRAVENOUS; SUBCUTANEOUS AS NEEDED
Status: DISCONTINUED | OUTPATIENT
Start: 2024-02-27 | End: 2024-02-27 | Stop reason: HOSPADM

## 2024-02-27 RX ADMIN — TICAGRELOR 90 MG: 90 TABLET ORAL at 23:38

## 2024-02-27 RX ADMIN — ASPIRIN 81 MG CHEWABLE TABLET 81 MG: 81 TABLET CHEWABLE at 17:19

## 2024-02-27 RX ADMIN — ACETAMINOPHEN 650 MG: 325 TABLET ORAL at 21:34

## 2024-02-27 RX ADMIN — ATORVASTATIN CALCIUM 80 MG: 40 TABLET, FILM COATED ORAL at 20:53

## 2024-02-27 SDOH — SOCIAL STABILITY: SOCIAL NETWORK: ARE YOU MARRIED, WIDOWED, DIVORCED, SEPARATED, NEVER MARRIED, OR LIVING WITH A PARTNER?: DIVORCED

## 2024-02-27 SDOH — ECONOMIC STABILITY: FOOD INSECURITY: WITHIN THE PAST 12 MONTHS, THE FOOD YOU BOUGHT JUST DIDN'T LAST AND YOU DIDN'T HAVE MONEY TO GET MORE.: NEVER TRUE

## 2024-02-27 SDOH — HEALTH STABILITY: MENTAL HEALTH
STRESS IS WHEN SOMEONE FEELS TENSE, NERVOUS, ANXIOUS, OR CAN'T SLEEP AT NIGHT BECAUSE THEIR MIND IS TROUBLED. HOW STRESSED ARE YOU?: ONLY A LITTLE

## 2024-02-27 SDOH — SOCIAL STABILITY: SOCIAL INSECURITY: WITHIN THE LAST YEAR, HAVE YOU BEEN AFRAID OF YOUR PARTNER OR EX-PARTNER?: NO

## 2024-02-27 SDOH — ECONOMIC STABILITY: INCOME INSECURITY: HOW HARD IS IT FOR YOU TO PAY FOR THE VERY BASICS LIKE FOOD, HOUSING, MEDICAL CARE, AND HEATING?: NOT HARD AT ALL

## 2024-02-27 SDOH — SOCIAL STABILITY: SOCIAL INSECURITY
WITHIN THE LAST YEAR, HAVE YOU BEEN KICKED, HIT, SLAPPED, OR OTHERWISE PHYSICALLY HURT BY YOUR PARTNER OR EX-PARTNER?: NO

## 2024-02-27 SDOH — ECONOMIC STABILITY: FOOD INSECURITY: WITHIN THE PAST 12 MONTHS, YOU WORRIED THAT YOUR FOOD WOULD RUN OUT BEFORE YOU GOT MONEY TO BUY MORE.: NEVER TRUE

## 2024-02-27 SDOH — ECONOMIC STABILITY: TRANSPORTATION INSECURITY
IN THE PAST 12 MONTHS, HAS THE LACK OF TRANSPORTATION KEPT YOU FROM MEDICAL APPOINTMENTS OR FROM GETTING MEDICATIONS?: NO

## 2024-02-27 SDOH — SOCIAL STABILITY: SOCIAL NETWORK
DO YOU BELONG TO ANY CLUBS OR ORGANIZATIONS SUCH AS CHURCH GROUPS UNIONS, FRATERNAL OR ATHLETIC GROUPS, OR SCHOOL GROUPS?: NO

## 2024-02-27 SDOH — SOCIAL STABILITY: SOCIAL INSECURITY: WITHIN THE LAST YEAR, HAVE YOU BEEN HUMILIATED OR EMOTIONALLY ABUSED IN OTHER WAYS BY YOUR PARTNER OR EX-PARTNER?: NO

## 2024-02-27 SDOH — SOCIAL STABILITY: SOCIAL NETWORK
IN A TYPICAL WEEK, HOW MANY TIMES DO YOU TALK ON THE PHONE WITH FAMILY, FRIENDS, OR NEIGHBORS?: MORE THAN THREE TIMES A WEEK

## 2024-02-27 SDOH — SOCIAL STABILITY: SOCIAL INSECURITY: WERE YOU ABLE TO COMPLETE ALL THE BEHAVIORAL HEALTH SCREENINGS?: YES

## 2024-02-27 SDOH — SOCIAL STABILITY: SOCIAL INSECURITY
WITHIN THE LAST YEAR, HAVE TO BEEN RAPED OR FORCED TO HAVE ANY KIND OF SEXUAL ACTIVITY BY YOUR PARTNER OR EX-PARTNER?: NO

## 2024-02-27 SDOH — ECONOMIC STABILITY: INCOME INSECURITY: IN THE LAST 12 MONTHS, WAS THERE A TIME WHEN YOU WERE NOT ABLE TO PAY THE MORTGAGE OR RENT ON TIME?: NO

## 2024-02-27 SDOH — ECONOMIC STABILITY: HOUSING INSECURITY
IN THE LAST 12 MONTHS, WAS THERE A TIME WHEN YOU DID NOT HAVE A STEADY PLACE TO SLEEP OR SLEPT IN A SHELTER (INCLUDING NOW)?: NO

## 2024-02-27 SDOH — SOCIAL STABILITY: SOCIAL INSECURITY: POSSIBLE ABUSE REPORTED TO:: ADVOCATE

## 2024-02-27 SDOH — SOCIAL STABILITY: SOCIAL INSECURITY: ARE YOU OR HAVE YOU BEEN THREATENED OR ABUSED PHYSICALLY, EMOTIONALLY, OR SEXUALLY BY ANYONE?: NO

## 2024-02-27 SDOH — HEALTH STABILITY: MENTAL HEALTH
EXPERIENCED ANY OF THE FOLLOWING LIFE EVENTS: SEXUAL ASSAULT;PHYSICAL ASSAULT;SOCIAL LOSS (BANKRUPTCY, DIVORCE, WORK-RELATED STRESS)

## 2024-02-27 SDOH — SOCIAL STABILITY: SOCIAL INSECURITY: DO YOU FEEL UNSAFE GOING BACK TO THE PLACE WHERE YOU ARE LIVING?: NO

## 2024-02-27 SDOH — SOCIAL STABILITY: SOCIAL INSECURITY: HAVE YOU HAD THOUGHTS OF HARMING ANYONE ELSE?: NO

## 2024-02-27 SDOH — HEALTH STABILITY: PHYSICAL HEALTH: ON AVERAGE, HOW MANY MINUTES DO YOU ENGAGE IN EXERCISE AT THIS LEVEL?: 10 MIN

## 2024-02-27 SDOH — SOCIAL STABILITY: SOCIAL INSECURITY: DOES ANYONE TRY TO KEEP YOU FROM HAVING/CONTACTING OTHER FRIENDS OR DOING THINGS OUTSIDE YOUR HOME?: NO

## 2024-02-27 SDOH — HEALTH STABILITY: PHYSICAL HEALTH: ON AVERAGE, HOW MANY DAYS PER WEEK DO YOU ENGAGE IN MODERATE TO STRENUOUS EXERCISE (LIKE A BRISK WALK)?: 2 DAYS

## 2024-02-27 SDOH — SOCIAL STABILITY: SOCIAL NETWORK: HOW OFTEN DO YOU ATTENT MEETINGS OF THE CLUB OR ORGANIZATION YOU BELONG TO?: NEVER

## 2024-02-27 SDOH — SOCIAL STABILITY: SOCIAL INSECURITY: HAS ANYONE EVER THREATENED TO HURT YOUR FAMILY OR YOUR PETS?: NO

## 2024-02-27 SDOH — SOCIAL STABILITY: SOCIAL NETWORK: HOW OFTEN DO YOU ATTEND CHURCH OR RELIGIOUS SERVICES?: NEVER

## 2024-02-27 SDOH — SOCIAL STABILITY: SOCIAL INSECURITY: DO YOU FEEL ANYONE HAS EXPLOITED OR TAKEN ADVANTAGE OF YOU FINANCIALLY OR OF YOUR PERSONAL PROPERTY?: NO

## 2024-02-27 SDOH — ECONOMIC STABILITY: HOUSING INSECURITY: IN THE LAST 12 MONTHS, HOW MANY PLACES HAVE YOU LIVED?: 1

## 2024-02-27 SDOH — SOCIAL STABILITY: SOCIAL NETWORK: HOW OFTEN DO YOU GET TOGETHER WITH FRIENDS OR RELATIVES?: MORE THAN THREE TIMES A WEEK

## 2024-02-27 SDOH — ECONOMIC STABILITY: TRANSPORTATION INSECURITY
IN THE PAST 12 MONTHS, HAS LACK OF TRANSPORTATION KEPT YOU FROM MEETINGS, WORK, OR FROM GETTING THINGS NEEDED FOR DAILY LIVING?: NO

## 2024-02-27 SDOH — SOCIAL STABILITY: SOCIAL INSECURITY: ARE THERE ANY APPARENT SIGNS OF INJURIES/BEHAVIORS THAT COULD BE RELATED TO ABUSE/NEGLECT?: NO

## 2024-02-27 SDOH — SOCIAL STABILITY: SOCIAL INSECURITY: ABUSE: ADULT

## 2024-02-27 ASSESSMENT — COGNITIVE AND FUNCTIONAL STATUS - GENERAL
DRESSING REGULAR LOWER BODY CLOTHING: A LITTLE
TOILETING: A LITTLE
WALKING IN HOSPITAL ROOM: A LITTLE
TURNING FROM BACK TO SIDE WHILE IN FLAT BAD: A LITTLE
STANDING UP FROM CHAIR USING ARMS: A LITTLE
MOBILITY SCORE: 18
DAILY ACTIVITIY SCORE: 20
PATIENT BASELINE BEDBOUND: NO
MOVING TO AND FROM BED TO CHAIR: A LITTLE
HELP NEEDED FOR BATHING: A LITTLE
DAILY ACTIVITIY SCORE: 24
CLIMB 3 TO 5 STEPS WITH RAILING: A LITTLE
EATING MEALS: A LITTLE
MOBILITY SCORE: 24
MOVING FROM LYING ON BACK TO SITTING ON SIDE OF FLAT BED WITH BEDRAILS: A LITTLE

## 2024-02-27 ASSESSMENT — PAIN SCALES - GENERAL
PAINLEVEL_OUTOF10: 3
PAINLEVEL_OUTOF10: 0 - NO PAIN
PAINLEVEL_OUTOF10: 5 - MODERATE PAIN
PAINLEVEL_OUTOF10: 0 - NO PAIN
PAINLEVEL_OUTOF10: 0 - NO PAIN
PAINLEVEL_OUTOF10: 2
PAINLEVEL_OUTOF10: 5 - MODERATE PAIN
PAINLEVEL_OUTOF10: 0 - NO PAIN
PAINLEVEL_OUTOF10: 0 - NO PAIN

## 2024-02-27 ASSESSMENT — PAIN - FUNCTIONAL ASSESSMENT
PAIN_FUNCTIONAL_ASSESSMENT: 0-10

## 2024-02-27 ASSESSMENT — ACTIVITIES OF DAILY LIVING (ADL)
ASSISTIVE_DEVICE: EYEGLASSES;DENTURES UPPER;DENTURES PARTIAL
FEEDING YOURSELF: INDEPENDENT
DRESSING YOURSELF: INDEPENDENT
GROOMING: INDEPENDENT
WALKS IN HOME: INDEPENDENT
PATIENT'S MEMORY ADEQUATE TO SAFELY COMPLETE DAILY ACTIVITIES?: YES
HEARING - RIGHT EAR: FUNCTIONAL
ADEQUATE_TO_COMPLETE_ADL: YES
TOILETING: INDEPENDENT
JUDGMENT_ADEQUATE_SAFELY_COMPLETE_DAILY_ACTIVITIES: YES
HEARING - LEFT EAR: FUNCTIONAL
BATHING: INDEPENDENT

## 2024-02-27 ASSESSMENT — PAIN DESCRIPTION - DESCRIPTORS
DESCRIPTORS: DULL
DESCRIPTORS: DULL

## 2024-02-27 ASSESSMENT — PATIENT HEALTH QUESTIONNAIRE - PHQ9
2. FEELING DOWN, DEPRESSED OR HOPELESS: SEVERAL DAYS
1. LITTLE INTEREST OR PLEASURE IN DOING THINGS: NOT AT ALL
SUM OF ALL RESPONSES TO PHQ9 QUESTIONS 1 & 2: 1

## 2024-02-27 ASSESSMENT — LIFESTYLE VARIABLES
HOW MANY STANDARD DRINKS CONTAINING ALCOHOL DO YOU HAVE ON A TYPICAL DAY: PATIENT DOES NOT DRINK
SUBSTANCE_ABUSE_PAST_12_MONTHS: NO
AUDIT-C TOTAL SCORE: 0
PRESCIPTION_ABUSE_PAST_12_MONTHS: NO
AUDIT-C TOTAL SCORE: 0
HOW OFTEN DO YOU HAVE 6 OR MORE DRINKS ON ONE OCCASION: NEVER
SKIP TO QUESTIONS 9-10: 1
HOW OFTEN DO YOU HAVE A DRINK CONTAINING ALCOHOL: NEVER

## 2024-02-27 ASSESSMENT — PAIN SCALES - PAIN ASSESSMENT IN ADVANCED DEMENTIA (PAINAD)
CONSOLABILITY: NO NEED TO CONSOLE
BREATHING: NORMAL
BODYLANGUAGE: RELAXED

## 2024-02-27 ASSESSMENT — PAIN DESCRIPTION - LOCATION: LOCATION: GROIN

## 2024-02-27 ASSESSMENT — PAIN SCALES - WONG BAKER: WONGBAKER_NUMERICALRESPONSE: HURTS LITTLE MORE

## 2024-02-27 ASSESSMENT — PAIN DESCRIPTION - ORIENTATION: ORIENTATION: RIGHT

## 2024-02-27 NOTE — POST-PROCEDURE NOTE
Physician Transition of Care Summary  Invasive Cardiovascular Lab    Procedure Date: 2/27/2024  Attending:    * Addison Reaves - Primary  Resident/Fellow/Other Assistant: Surgeon(s) and Role:    Indications:   Pre-op Diagnosis     * Other chest pain [R07.89]    Post-procedure diagnosis:   Post-op Diagnosis     * Other chest pain [R07.89]    Procedure(s):   Left Heart Cath, No LV  15990 - MD CATH PLMT L HRT & ARTS W/NJX & ANGIO IMG S&I        Procedure Findings:   Stent placed in proximal LAD    Description of the Procedure:   See cath report    Complications:   none    Stents/Implants:   Cardiovascular Implants       Stent    Stent, [place] Delvis, 3.00 X 22rx - [id number] - Implanted        Inventory item: STENT, JERSON FRONTIER DELVIS, 3.00 X 22RX Model/Cat number: [id number]    : [employer] Lot number: [id number]    Device identifier: [id number]        As of 2/27/2024       Status: Implanted                              Anticoagulation/Antiplatelet Plan:   ASA plus brilinta    Estimated Blood Loss:   5 mL    Anesthesia: Moderate Sedation Anesthesia Staff: No anesthesia staff entered.    Any Specimen(s) Removed:   No specimens collected during this procedure.    Disposition:   Admit for observation as she complaints of chest pain and will discharge her in am      Electronically signed by: Addison Reaves MD, 2/27/2024 4:42 PM

## 2024-02-27 NOTE — NURSING NOTE
Assumed care of patient.  Patient arrived from [facility]  Bedside shift report received.   Groin site intact  No c/o pain or discomfort at this time.  Abdomen soft and non distended.  Lung sounds diminished.  Call light in reach.

## 2024-02-27 NOTE — Clinical Note
Angioplasty of the proximal left anterior descending lesion. Inflation 1: Pressure = 8 molly; Duration = 45 sec.

## 2024-02-28 VITALS
OXYGEN SATURATION: 97 % | RESPIRATION RATE: 15 BRPM | DIASTOLIC BLOOD PRESSURE: 66 MMHG | BODY MASS INDEX: 26.29 KG/M2 | HEART RATE: 61 BPM | SYSTOLIC BLOOD PRESSURE: 123 MMHG | HEIGHT: 62 IN | TEMPERATURE: 98.1 F | WEIGHT: 142.86 LBS

## 2024-02-28 PROCEDURE — 2500000001 HC RX 250 WO HCPCS SELF ADMINISTERED DRUGS (ALT 637 FOR MEDICARE OP): Performed by: INTERNAL MEDICINE

## 2024-02-28 PROCEDURE — 2500000002 HC RX 250 W HCPCS SELF ADMINISTERED DRUGS (ALT 637 FOR MEDICARE OP, ALT 636 FOR OP/ED): Performed by: INTERNAL MEDICINE

## 2024-02-28 PROCEDURE — G0378 HOSPITAL OBSERVATION PER HR: HCPCS

## 2024-02-28 RX ADMIN — ERGOCALCIFEROL 1250 MCG: 1.25 CAPSULE ORAL at 09:59

## 2024-02-28 RX ADMIN — ASPIRIN 81 MG CHEWABLE TABLET 81 MG: 81 TABLET CHEWABLE at 09:59

## 2024-02-28 RX ADMIN — TICAGRELOR 90 MG: 90 TABLET ORAL at 09:59

## 2024-02-28 RX ADMIN — PANTOPRAZOLE SODIUM 20 MG: 20 TABLET, DELAYED RELEASE ORAL at 06:05

## 2024-02-28 ASSESSMENT — PAIN - FUNCTIONAL ASSESSMENT: PAIN_FUNCTIONAL_ASSESSMENT: 0-10

## 2024-02-28 ASSESSMENT — PAIN SCALES - GENERAL: PAINLEVEL_OUTOF10: 3

## 2024-02-28 NOTE — DISCHARGE SUMMARY
Discharge Diagnosis  Coronary artery disease with stent placement to the proximal and mid left anterior descending coronary artery    Issues Requiring Follow-Up  Coronary artery disease    Test Results Pending At Discharge  Pending Labs       No current pending labs.            Hospital Course   Patient was brought in for elective left heart catheterization.  She was noted to have severe stenosis involving the proximal and mid left anterior Descending coronary artery for which she underwent ad hoc angioplasty with deployment of a 3.0 into 22 mm regulating stent.  Patient tolerated the procedure with no complications.  She was observed in the hospital overnight and she has been discharged home today to follow-up with me on 8 March in our office.    Pertinent Physical Exam At Time of Discharge  Physical Exam  No new changes noted her right groin is clean with no hematoma  Home Medications     Medication List      CONTINUE taking these medications     * albuterol 90 mcg/actuation inhaler; Inhale 2 puffs every 4 hours if   needed for wheezing.   * albuterol 90 mcg/actuation inhaler; Inhale 2 puffs every 4 hours if   needed for wheezing.; Notes to patient: Duplicate Order   aspirin 81 mg EC tablet   atorvastatin 40 mg tablet; Commonly known as: Lipitor   ergocalciferol 1.25 MG (47857 UT) capsule; Commonly known as: Vitamin   D-2; Notes to patient: Resume home schedule   estradiol 0.01 % (0.1 mg/gram) vaginal cream; Commonly known as:   Estrace; Notes to patient: Resume home schedule   fluticasone 50 mcg/actuation nasal spray; Commonly known as: Flonase   ibandronate 150 mg tablet; Commonly known as: Boniva; Notes to patient:   Resume home schedule   magnesium oxide 400 mg tablet; Commonly known as: Mag-Ox   nitroglycerin 0.3 mg SL tablet; Commonly known as: Nitrostat   nystatin ointment; Commonly known as: Mycostatin   pantoprazole 20 mg EC tablet; Commonly known as: ProtoNix   promethazine 25 mg tablet; Commonly known  as: Phenergan   triamterene-hydrochlorothiazid 37.5-25 mg capsule; Commonly known as:   Dyazide  * This list has 2 medication(s) that are the same as other medications   prescribed for you. Read the directions carefully, and ask your doctor or   other care provider to review them with you.     ASK your doctor about these medications     * ticagrelor 90 mg tablet; Commonly known as: Brilinta; Take 1 tablet   (90 mg) by mouth 2 times a day.; Ask about: Which instructions should I   use?   * Brilinta 90 mg tablet; Generic drug: ticagrelor; Take 1 tablet by   mouth twice a day; Ask about: Which instructions should I use?  * This list has 2 medication(s) that are the same as other medications   prescribed for you. Read the directions carefully, and ask your doctor or   other care provider to review them with you.       Outpatient Follow-Up  Follow-up with me on March 8 in our [place] office  Addison Reaves MD

## 2024-02-28 NOTE — NURSING NOTE
Complained of something a lump to Right Groin, Saying she had Hernia Repair on that site, Assessed, Slight hematoma noted. Explained and pressure applied. Will monitor.   Hide Additional Notes?: No Detail Level: Detailed Additional Notes (Optional): Where he has a mole removed

## 2024-02-28 NOTE — PROGRESS NOTES
Per chart review pt independent from home and has no needs from case management. Please consult if needs arise     Evie Sanders RN

## 2024-02-28 NOTE — NURSING NOTE
Rounded and Reassessed, Right Groin puncture site site soft to touch, <o hematoma or bleeding noted. Will continue to monitor.

## 2024-02-28 NOTE — CARE PLAN
Problem: Pain  Goal: My pain/discomfort is manageable  Outcome: Progressing     Problem: Safety  Goal: Patient will be injury free during hospitalization  Outcome: Progressing  Goal: I will remain free of falls  Outcome: Progressing     Problem: Daily Care  Goal: Daily care needs are met  Outcome: Progressing     Problem: Psychosocial Needs  Goal: Demonstrates ability to cope with hospitalization/illness  Outcome: Progressing  Goal: Collaborate with me, my family, and caregiver to identify my specific goals  Outcome: Progressing     Problem: Discharge Barriers  Goal: My discharge needs are met  Outcome: Progressing     Problem: Pain  Goal: Takes deep breaths with improved pain control throughout the shift  Outcome: Progressing  Goal: Turns in bed with improved pain control throughout the shift  Outcome: Progressing  Goal: Walks with improved pain control throughout the shift  Outcome: Progressing  Goal: Performs ADL's with improved pain control throughout shift  Outcome: Progressing  Goal: Participates in PT with improved pain control throughout the shift  Outcome: Progressing  Goal: Free from opioid side effects throughout the shift  Outcome: Progressing  Goal: Free from acute confusion related to pain meds throughout the shift  Outcome: Progressing   The patient's goals for the shift include to remain fall free    The clinical goals for the shift include to remain fall free    Over the shift, the patient did not make progress toward the following goals. Barriers to progression include . Recommendations to address these barriers include .

## 2024-02-28 NOTE — NURSING NOTE
Stent education/card/handout given to patient. Discussed importance of taking medication as prescribed/following up with physician appointments. Discussed benefits of heart healthy diet/maintaining a healthy weight. Discussed cardiac rehab. Cardiac rehab staff will contact patient following discharge for enrollment in program.

## 2024-02-28 NOTE — NURSING NOTE
No changes from previous assessment. Resting comfortably, Not in need at this time. Call light in reach.

## 2024-02-28 NOTE — NURSING NOTE
Assumed care of pt, pt is awake lying in bed, HR is 91 SR on tele, bed locked and lowered, dressing has small amount of bloody drainage, on RA, bedside shift report given by EVELYN Dick, call light w/in reach.

## 2024-02-28 NOTE — NURSING NOTE
Assumed care of patient. Bedside Report done. Post Heart Catheterization. Assessed, Dressing to Right groin stained with small old blood, Dressing changed. Will monitor.

## 2024-02-29 LAB
ATRIAL RATE: 70 BPM
ATRIAL RATE: 75 BPM
P AXIS: 77 DEGREES
P AXIS: 86 DEGREES
P OFFSET: 188 MS
P OFFSET: 192 MS
P ONSET: 142 MS
P ONSET: 145 MS
PR INTERVAL: 162 MS
PR INTERVAL: 170 MS
Q ONSET: 226 MS
Q ONSET: 227 MS
QRS COUNT: 12 BEATS
QRS COUNT: 12 BEATS
QRS DURATION: 78 MS
QRS DURATION: 82 MS
QT INTERVAL: 404 MS
QT INTERVAL: 512 MS
QTC CALCULATION(BAZETT): 436 MS
QTC CALCULATION(BAZETT): 571 MS
QTC FREDERICIA: 425 MS
QTC FREDERICIA: 551 MS
R AXIS: 52 DEGREES
R AXIS: 52 DEGREES
T AXIS: 122 DEGREES
T AXIS: 124 DEGREES
T OFFSET: 429 MS
T OFFSET: 482 MS
VENTRICULAR RATE: 70 BPM
VENTRICULAR RATE: 75 BPM

## 2024-03-16 ENCOUNTER — PHARMACY VISIT (OUTPATIENT)
Dept: PHARMACY | Facility: CLINIC | Age: 74
End: 2024-03-16
Payer: COMMERCIAL

## 2024-03-16 PROCEDURE — RXMED WILLOW AMBULATORY MEDICATION CHARGE

## 2024-03-18 DIAGNOSIS — Z95.5 STENTED CORONARY ARTERY: Primary | ICD-10-CM

## 2024-03-25 ENCOUNTER — CLINICAL SUPPORT (OUTPATIENT)
Dept: CARDIAC REHAB | Facility: CLINIC | Age: 74
End: 2024-03-25
Payer: MEDICARE

## 2024-03-25 DIAGNOSIS — Z95.5 STENTED CORONARY ARTERY: ICD-10-CM

## 2024-03-29 ENCOUNTER — CLINICAL SUPPORT (OUTPATIENT)
Dept: CARDIAC REHAB | Facility: CLINIC | Age: 74
End: 2024-03-29
Payer: MEDICARE

## 2024-03-29 DIAGNOSIS — Z95.5 STENTED CORONARY ARTERY: ICD-10-CM

## 2024-03-29 PROCEDURE — 93798 PHYS/QHP OP CAR RHAB W/ECG: CPT | Performed by: INTERNAL MEDICINE

## 2024-04-01 ENCOUNTER — CLINICAL SUPPORT (OUTPATIENT)
Dept: CARDIAC REHAB | Facility: CLINIC | Age: 74
End: 2024-04-01
Payer: MEDICARE

## 2024-04-01 DIAGNOSIS — Z95.5 STENTED CORONARY ARTERY: ICD-10-CM

## 2024-04-01 PROCEDURE — 93798 PHYS/QHP OP CAR RHAB W/ECG: CPT | Performed by: INTERNAL MEDICINE

## 2024-04-05 ENCOUNTER — CLINICAL SUPPORT (OUTPATIENT)
Dept: CARDIAC REHAB | Facility: CLINIC | Age: 74
End: 2024-04-05
Payer: MEDICARE

## 2024-04-05 DIAGNOSIS — Z95.5 STENTED CORONARY ARTERY: ICD-10-CM

## 2024-04-05 PROCEDURE — 93798 PHYS/QHP OP CAR RHAB W/ECG: CPT | Performed by: INTERNAL MEDICINE

## 2024-04-08 ENCOUNTER — APPOINTMENT (OUTPATIENT)
Dept: CARDIAC REHAB | Facility: CLINIC | Age: 74
End: 2024-04-08
Payer: MEDICARE

## 2024-04-12 ENCOUNTER — CLINICAL SUPPORT (OUTPATIENT)
Dept: CARDIAC REHAB | Facility: CLINIC | Age: 74
End: 2024-04-12
Payer: MEDICARE

## 2024-04-12 DIAGNOSIS — Z95.5 STENTED CORONARY ARTERY: ICD-10-CM

## 2024-04-12 PROCEDURE — 93798 PHYS/QHP OP CAR RHAB W/ECG: CPT | Performed by: INTERNAL MEDICINE

## 2024-04-15 ENCOUNTER — CLINICAL SUPPORT (OUTPATIENT)
Dept: CARDIAC REHAB | Facility: CLINIC | Age: 74
End: 2024-04-15
Payer: MEDICARE

## 2024-04-15 DIAGNOSIS — Z95.5 STENTED CORONARY ARTERY: ICD-10-CM

## 2024-04-15 PROCEDURE — 93798 PHYS/QHP OP CAR RHAB W/ECG: CPT | Performed by: INTERNAL MEDICINE

## 2024-04-19 ENCOUNTER — CLINICAL SUPPORT (OUTPATIENT)
Dept: CARDIAC REHAB | Facility: CLINIC | Age: 74
End: 2024-04-19
Payer: MEDICARE

## 2024-04-19 DIAGNOSIS — Z95.5 STENTED CORONARY ARTERY: ICD-10-CM

## 2024-04-21 ENCOUNTER — HOSPITAL ENCOUNTER (EMERGENCY)
Facility: HOSPITAL | Age: 74
Discharge: HOME | End: 2024-04-22
Attending: STUDENT IN AN ORGANIZED HEALTH CARE EDUCATION/TRAINING PROGRAM
Payer: MEDICARE

## 2024-04-21 ENCOUNTER — APPOINTMENT (OUTPATIENT)
Dept: RADIOLOGY | Facility: HOSPITAL | Age: 74
End: 2024-04-21
Payer: MEDICARE

## 2024-04-21 ENCOUNTER — HOSPITAL ENCOUNTER (OUTPATIENT)
Dept: CARDIOLOGY | Facility: HOSPITAL | Age: 74
Discharge: HOME | End: 2024-04-21
Payer: MEDICARE

## 2024-04-21 DIAGNOSIS — R07.9 CHEST PAIN, UNSPECIFIED TYPE: Primary | ICD-10-CM

## 2024-04-21 LAB
ALBUMIN SERPL-MCNC: 4.4 G/DL (ref 3.5–5)
ALP BLD-CCNC: 70 U/L (ref 35–125)
ALT SERPL-CCNC: 22 U/L (ref 5–40)
ANION GAP SERPL CALC-SCNC: 12 MMOL/L
AST SERPL-CCNC: 26 U/L (ref 5–40)
BASOPHILS # BLD AUTO: 0.07 X10*3/UL (ref 0–0.1)
BASOPHILS NFR BLD AUTO: 1 %
BILIRUB SERPL-MCNC: 1 MG/DL (ref 0.1–1.2)
BUN SERPL-MCNC: 17 MG/DL (ref 8–25)
CALCIUM SERPL-MCNC: 9.4 MG/DL (ref 8.5–10.4)
CHLORIDE SERPL-SCNC: 105 MMOL/L (ref 97–107)
CO2 SERPL-SCNC: 25 MMOL/L (ref 24–31)
CREAT SERPL-MCNC: 0.6 MG/DL (ref 0.4–1.6)
EGFRCR SERPLBLD CKD-EPI 2021: >90 ML/MIN/1.73M*2
EOSINOPHIL # BLD AUTO: 0.33 X10*3/UL (ref 0–0.4)
EOSINOPHIL NFR BLD AUTO: 4.8 %
ERYTHROCYTE [DISTWIDTH] IN BLOOD BY AUTOMATED COUNT: 13.1 % (ref 11.5–14.5)
GLUCOSE SERPL-MCNC: 97 MG/DL (ref 65–99)
HCT VFR BLD AUTO: 50.5 % (ref 36–46)
HGB BLD-MCNC: 16.3 G/DL (ref 12–16)
IMM GRANULOCYTES # BLD AUTO: 0.02 X10*3/UL (ref 0–0.5)
IMM GRANULOCYTES NFR BLD AUTO: 0.3 % (ref 0–0.9)
LYMPHOCYTES # BLD AUTO: 1.75 X10*3/UL (ref 0.8–3)
LYMPHOCYTES NFR BLD AUTO: 25.2 %
MAGNESIUM SERPL-MCNC: 2.2 MG/DL (ref 1.6–3.1)
MCH RBC QN AUTO: 30.7 PG (ref 26–34)
MCHC RBC AUTO-ENTMCNC: 32.3 G/DL (ref 32–36)
MCV RBC AUTO: 95 FL (ref 80–100)
MONOCYTES # BLD AUTO: 0.59 X10*3/UL (ref 0.05–0.8)
MONOCYTES NFR BLD AUTO: 8.5 %
NEUTROPHILS # BLD AUTO: 4.18 X10*3/UL (ref 1.6–5.5)
NEUTROPHILS NFR BLD AUTO: 60.2 %
NRBC BLD-RTO: 0 /100 WBCS (ref 0–0)
PLATELET # BLD AUTO: 287 X10*3/UL (ref 150–450)
POTASSIUM SERPL-SCNC: 3.6 MMOL/L (ref 3.4–5.1)
PROT SERPL-MCNC: 7 G/DL (ref 5.9–7.9)
RBC # BLD AUTO: 5.31 X10*6/UL (ref 4–5.2)
SODIUM SERPL-SCNC: 142 MMOL/L (ref 133–145)
TROPONIN T SERPL-MCNC: 7 NG/L
TROPONIN T SERPL-MCNC: 8 NG/L
WBC # BLD AUTO: 6.9 X10*3/UL (ref 4.4–11.3)

## 2024-04-21 PROCEDURE — 84484 ASSAY OF TROPONIN QUANT: CPT | Performed by: STUDENT IN AN ORGANIZED HEALTH CARE EDUCATION/TRAINING PROGRAM

## 2024-04-21 PROCEDURE — 2500000001 HC RX 250 WO HCPCS SELF ADMINISTERED DRUGS (ALT 637 FOR MEDICARE OP): Performed by: PHYSICIAN ASSISTANT

## 2024-04-21 PROCEDURE — 83735 ASSAY OF MAGNESIUM: CPT | Performed by: STUDENT IN AN ORGANIZED HEALTH CARE EDUCATION/TRAINING PROGRAM

## 2024-04-21 PROCEDURE — 71045 X-RAY EXAM CHEST 1 VIEW: CPT

## 2024-04-21 PROCEDURE — 93005 ELECTROCARDIOGRAM TRACING: CPT

## 2024-04-21 PROCEDURE — 85025 COMPLETE CBC W/AUTO DIFF WBC: CPT | Performed by: STUDENT IN AN ORGANIZED HEALTH CARE EDUCATION/TRAINING PROGRAM

## 2024-04-21 PROCEDURE — 36415 COLL VENOUS BLD VENIPUNCTURE: CPT | Performed by: STUDENT IN AN ORGANIZED HEALTH CARE EDUCATION/TRAINING PROGRAM

## 2024-04-21 PROCEDURE — 80051 ELECTROLYTE PANEL: CPT | Performed by: STUDENT IN AN ORGANIZED HEALTH CARE EDUCATION/TRAINING PROGRAM

## 2024-04-21 PROCEDURE — 71045 X-RAY EXAM CHEST 1 VIEW: CPT | Performed by: RADIOLOGY

## 2024-04-21 PROCEDURE — 99283 EMERGENCY DEPT VISIT LOW MDM: CPT

## 2024-04-21 RX ORDER — NITROGLYCERIN 0.4 MG/1
0.4 TABLET SUBLINGUAL ONCE
Status: DISCONTINUED | OUTPATIENT
Start: 2024-04-21 | End: 2024-04-22 | Stop reason: HOSPADM

## 2024-04-21 RX ORDER — NAPROXEN SODIUM 220 MG/1
324 TABLET, FILM COATED ORAL ONCE
Status: COMPLETED | OUTPATIENT
Start: 2024-04-21 | End: 2024-04-21

## 2024-04-21 RX ADMIN — ASPIRIN 81 MG CHEWABLE TABLET 324 MG: 81 TABLET CHEWABLE at 21:09

## 2024-04-21 ASSESSMENT — COLUMBIA-SUICIDE SEVERITY RATING SCALE - C-SSRS
6. HAVE YOU EVER DONE ANYTHING, STARTED TO DO ANYTHING, OR PREPARED TO DO ANYTHING TO END YOUR LIFE?: NO
1. IN THE PAST MONTH, HAVE YOU WISHED YOU WERE DEAD OR WISHED YOU COULD GO TO SLEEP AND NOT WAKE UP?: NO
2. HAVE YOU ACTUALLY HAD ANY THOUGHTS OF KILLING YOURSELF?: NO

## 2024-04-21 ASSESSMENT — PAIN SCALES - GENERAL: PAINLEVEL_OUTOF10: 2

## 2024-04-21 ASSESSMENT — PAIN - FUNCTIONAL ASSESSMENT: PAIN_FUNCTIONAL_ASSESSMENT: 0-10

## 2024-04-22 ENCOUNTER — CLINICAL SUPPORT (OUTPATIENT)
Dept: CARDIAC REHAB | Facility: CLINIC | Age: 74
End: 2024-04-22
Payer: MEDICARE

## 2024-04-22 VITALS
WEIGHT: 134 LBS | HEIGHT: 61 IN | SYSTOLIC BLOOD PRESSURE: 114 MMHG | HEART RATE: 59 BPM | BODY MASS INDEX: 25.3 KG/M2 | TEMPERATURE: 97.5 F | DIASTOLIC BLOOD PRESSURE: 72 MMHG | RESPIRATION RATE: 17 BRPM | OXYGEN SATURATION: 95 %

## 2024-04-22 DIAGNOSIS — Z95.5 STENTED CORONARY ARTERY: ICD-10-CM

## 2024-04-22 LAB
ATRIAL RATE: 61 BPM
P AXIS: 98 DEGREES
P OFFSET: 196 MS
P ONSET: 139 MS
PR INTERVAL: 162 MS
Q ONSET: 220 MS
QRS COUNT: 10 BEATS
QRS DURATION: 72 MS
QT INTERVAL: 412 MS
QTC CALCULATION(BAZETT): 414 MS
QTC FREDERICIA: 414 MS
R AXIS: 86 DEGREES
T AXIS: 72 DEGREES
T OFFSET: 426 MS
VENTRICULAR RATE: 61 BPM

## 2024-04-22 ASSESSMENT — LIFESTYLE VARIABLES
TOTAL SCORE: 0
HAVE PEOPLE ANNOYED YOU BY CRITICIZING YOUR DRINKING: NO
HAVE YOU EVER FELT YOU SHOULD CUT DOWN ON YOUR DRINKING: NO
EVER FELT BAD OR GUILTY ABOUT YOUR DRINKING: NO
EVER HAD A DRINK FIRST THING IN THE MORNING TO STEADY YOUR NERVES TO GET RID OF A HANGOVER: NO

## 2024-04-22 NOTE — ED PROVIDER NOTES
Supervisory note:  Patient seen in conjunction with SARAI Tavares.  Patient presents with pain in the chest.  She states that it started yesterday.  It has been off-and-on since then.  Not made worse with exertion.  It is made worse when she pushes on the area or touches an area of her chest underneath her left breast.  She reports that she has been more out of breath with exertion recently, similar to before she got her stent 2 months ago.  On examination, heart and lung sounds are unremarkable.  There is no pretibial edema.    PE, aortic dissection, esophageal perforation, pneumothorax felt to be very unlikely.  Patient's case was discussed with her cardiologist who agrees to see her in office tomorrow.  Return precautions given for any worsening symptoms.    I personally saw the patient and made/approved the management plan and take responsiblity for the patient management.  Parts of this chart were completed with dictation software, please excuse any errors in transcription.     Raymundo Asher MD  04/23/24 [id number]

## 2024-04-22 NOTE — ED NOTES
D.c. papers given. Pt advised to call cardiology 1st thing tomorrow for follow up tomorrow. Return back to ED immediately should symptoms change/worsen. Gait steady to lobby. Home with daughter.      Rahul Charles RN  04/22/24 0034

## 2024-04-22 NOTE — DISCHARGE INSTRUCTIONS
Please follow-up with Dr. Martinez tomorrow morning.  Please call his office to schedule an appointment.  Please return to the emergency department with new or worsening symptoms or the onset of new symptoms.

## 2024-04-22 NOTE — ED PROVIDER NOTES
HPI   Chief Complaint   Patient presents with    Chest Pain       This is a 73-year-old female with a past medical history of coronary artery disease, had left anterior descending elective stent placed on 2024 presenting to the emergency department with chest pain beginning yesterday.  The pain has been coming and going over the last 24 hours approximately.  The pain is not associated with activity or exertion.  There is no associated nausea, diaphoresis or shortness of breath.  Patient states that she has not had pain like this since prior to stent placement.  No coughing, fever, chills, abdominal pain, nausea or vomiting.      Please see HPI for pertinent positive and negative ROS.                 [place] Coma Scale Score: 15                     Patient History   Past Medical History:   Diagnosis Date    COPD (chronic obstructive pulmonary disease) (Multi)      Past Surgical History:   Procedure Laterality Date    CARDIAC CATHETERIZATION N/A 2024    Procedure: Left Heart Cath, No LV;  Surgeon: Addison Reaves MD;  Location: [facility] Cardiac Cath Lab;  Service: Cardiovascular;  Laterality: N/A;  no pre auth required    OTHER SURGICAL HISTORY  2022    Appendectomy    OTHER SURGICAL HISTORY  2022     section    OTHER SURGICAL HISTORY  2022    Hernia repair    OTHER SURGICAL HISTORY  2022    Wrist surgery     No family history on file.  Social History     Tobacco Use    Smoking status: Former     Current packs/day: 0.00     Types: Cigarettes     Quit date:      Years since quittin.3    Smokeless tobacco: Never   Substance Use Topics    Alcohol use: Not on file    Drug use: Not on file       Physical Exam   ED Triage Vitals [24 1938]   Temperature Heart Rate Respirations BP   36.4 °C (97.5 °F) 63 20 (!) 120/47      Pulse Ox Temp src Heart Rate Source Patient Position   95 % -- -- --      BP Location FiO2 (%)     -- --       Physical Exam  GENERAL APPEARANCE: Awake  and alert. No acute distress.   VITAL SIGNS: As per the nurses' triage record.  HEENT: Normocephalic, atraumatic. Extraocular muscles are intact. Conjunctiva are pink. Negative scleral icterus. Mucous membranes are moist. Tongue in the midline. Oropharynx clear, uvula midline.   NECK: Soft, nontender and supple, full gross ROM, no meningeal signs.  CHEST: Nontender to palpation. Clear to auscultation bilaterally. No rales, rhonchi, or wheezing. Symmetric rise and fall of chest wall.   HEART: Clear S1 and S2. Regular rate and rhythm.  Strong and equal pulses in the extremities.  ABDOMEN: Soft, nontender, nondistended, positive bowel sounds, no palpable masses.  MUSCULOSKELETAL: The calves are nontender to palpation. Full gross active range of motion. Ambulating on own with no acute difficulties  NEUROLOGICAL: Awake, alert and oriented x 3. Motor power intact in the upper and lower extremities. Sensation is intact to light touch in the upper and lower extremities. Patient answering questions appropriately.   IMMUNOLOGICAL: No lymphatic streaking noted  DERMATOLOGIC: Warm and dry without petechiae, rashes, or ecchymosis noted on visible skin.   PYSCH: Cooperative with appropriate mood and affect.  ED Course & MDM   ED Course as of 04/22/24 0011   Sun Apr 21, 2024 1944 EKG interpretation: Normal sinus rhythm with arrhythmia, rate 61.  Normal axis.  No ST or T wave abnormalities.  Anterior Q waves.  Compared with previous EKG dated 27 February 2024, no changes are seen. [ML]      ED Course User Index  [ML] Raymundo Asher MD         Diagnoses as of 04/22/24 0011   Chest pain, unspecified type       Medical Decision Making  Parts of this chart have been completed using voice recognition software. Please excuse any errors of transcription.  My thought process and reason for plan has been formulated from the time that I saw the patient until the time of disposition and is not specific to one specific moment during their  visit and furthermore my MDM encompasses this entire chart and not only this text box.      HPI: Detailed above.    Exam: A medically appropriate exam performed, outlined above, given the known history and presentation.    History obtained from: Patient    EKG: See my supervising physician's EKG interpretation    Medications given during visit:  Medications   nitroglycerin (Nitrostat) SL tablet 0.4 mg (0.4 mg sublingual Not Given 4/21/24 2010)   aspirin chewable tablet 324 mg (324 mg oral Given 4/21/24 2109)        Diagnostic/tests  Labs Reviewed   CBC WITH AUTO DIFFERENTIAL - Abnormal       Result Value    WBC 6.9      nRBC 0.0      RBC 5.31 (*)     Hemoglobin 16.3 (*)     Hematocrit 50.5 (*)     MCV 95      MCH 30.7      MCHC 32.3      RDW 13.1      Platelets 287      Neutrophils % 60.2      Immature Granulocytes %, Automated 0.3      Lymphocytes % 25.2      Monocytes % 8.5      Eosinophils % 4.8      Basophils % 1.0      Neutrophils Absolute 4.18      Immature Granulocytes Absolute, Automated 0.02      Lymphocytes Absolute 1.75      Monocytes Absolute 0.59      Eosinophils Absolute 0.33      Basophils Absolute 0.07     COMPREHENSIVE METABOLIC PANEL - Normal    Glucose 97      Sodium 142      Potassium 3.6      Chloride 105      Bicarbonate 25      Urea Nitrogen 17      Creatinine 0.60      eGFR >90      Calcium 9.4      Albumin 4.4      Alkaline Phosphatase 70      Total Protein 7.0      AST 26      Bilirubin, Total 1.0      ALT 22      Anion Gap 12     MAGNESIUM - Normal    Magnesium 2.20     SERIAL TROPONIN, INITIAL ([facility]) - Normal    Troponin T, High Sensitivity 8     SERIAL TROPONIN,  2 HOUR ([facility]) - Normal    Troponin T, High Sensitivity 7     TROPONIN T SERIES, HIGH SENSITIVITY (0, 2 HR, 6 HR)    Narrative:     The following orders were created for panel order Troponin T Series, High Sensitivity (0, 2HR, 6HR).  Procedure                               Abnormality         Status                     ---------                                -----------         ------                     Serial Troponin, Initial...[[id number]]  Normal              Final result               Serial Troponin, 2 Hour ...[[id number]]  Normal              Final result               Serial Troponin, 6 Hour ...[[id number]]                                                   Please view results for these tests on the individual orders.   SERIAL TROPONIN,  2 HOUR ([facility])   SERIAL TROPONIN, 6 HOUR ([facility])   SERIAL TROPONIN, 6 HOUR ([facility])      XR chest 1 view   Final Result   1. No acute cardiopulmonary process.        MACRO:   None.        Signed by: Jayshree Robbins 4/21/2024 8:41 PM   Dictation workstation:   [id number]           Considerations/further MDM:  Patient was seen in conjucntion with my supervising physician,  Dr. Asher. Please refer to his note.    Patient presents with blood pressure 120/47, afebrile, no tachycardia, no tachypnea, 95% on room air    Differential diagnosis includes was not limited to acute coronary syndrome versus pneumothorax versus pneumonia versus arrhythmia versus pulmonary embolism     Chest x-ray shows no acute cardiopulmonary process.  Initial troponin T HS 8, repeat troponin T HS 7.  CMP unremarkable.  Magnesium 2.2.    HEART SCORE 4.  She was treated with aspirin 324 mg PO    I did speak with patient's cardiologist, Dr. Reaves who said that if patient second troponin is not elevated, she is safe for discharge home and he will see her on Monday in his office as an outpatient follow-up.     The patient was evaluated in the emergency department and an etiology requiring emergent treatment or admission to hospital was not identified.  All labs, imaging, and diagnostic studies were reviewed by me and the patient was counseled on clinical impression, expectations, and plan.  Patient was educated to follow-up with cardiology tomorrow.  All questions from patient were answered.  They elicited understanding and were  agreeable to course of treatment.  Patient was discharged in stable condition and given strict return precautions including new or worsening symptoms.    Procedure  Procedures     Adrianna Stroud PA-C  04/22/24 0011

## 2024-04-26 ENCOUNTER — CLINICAL SUPPORT (OUTPATIENT)
Dept: CARDIAC REHAB | Facility: CLINIC | Age: 74
End: 2024-04-26
Payer: MEDICARE

## 2024-04-26 DIAGNOSIS — Z95.5 STENTED CORONARY ARTERY: ICD-10-CM

## 2024-04-29 ENCOUNTER — APPOINTMENT (OUTPATIENT)
Dept: CARDIAC REHAB | Facility: CLINIC | Age: 74
End: 2024-04-29
Payer: MEDICARE

## 2024-05-03 ENCOUNTER — CLINICAL SUPPORT (OUTPATIENT)
Dept: CARDIAC REHAB | Facility: CLINIC | Age: 74
End: 2024-05-03
Payer: MEDICARE

## 2024-05-03 DIAGNOSIS — Z95.5 STENTED CORONARY ARTERY: ICD-10-CM

## 2024-05-03 PROCEDURE — 93798 PHYS/QHP OP CAR RHAB W/ECG: CPT | Performed by: INTERNAL MEDICINE

## 2024-05-06 ENCOUNTER — CLINICAL SUPPORT (OUTPATIENT)
Dept: CARDIAC REHAB | Facility: CLINIC | Age: 74
End: 2024-05-06
Payer: MEDICARE

## 2024-05-06 DIAGNOSIS — Z95.5 STENTED CORONARY ARTERY: ICD-10-CM

## 2024-05-06 PROCEDURE — 93798 PHYS/QHP OP CAR RHAB W/ECG: CPT | Performed by: INTERNAL MEDICINE

## 2024-05-10 ENCOUNTER — CLINICAL SUPPORT (OUTPATIENT)
Dept: CARDIAC REHAB | Facility: CLINIC | Age: 74
End: 2024-05-10
Payer: MEDICARE

## 2024-05-10 DIAGNOSIS — Z95.5 STENTED CORONARY ARTERY: ICD-10-CM

## 2024-05-10 PROCEDURE — 93798 PHYS/QHP OP CAR RHAB W/ECG: CPT | Performed by: INTERNAL MEDICINE

## 2024-05-13 ENCOUNTER — CLINICAL SUPPORT (OUTPATIENT)
Dept: CARDIAC REHAB | Facility: CLINIC | Age: 74
End: 2024-05-13
Payer: MEDICARE

## 2024-05-13 DIAGNOSIS — Z95.5 STENTED CORONARY ARTERY: ICD-10-CM

## 2024-05-13 PROCEDURE — 93798 PHYS/QHP OP CAR RHAB W/ECG: CPT | Performed by: INTERNAL MEDICINE

## 2024-05-16 ENCOUNTER — HOSPITAL ENCOUNTER (EMERGENCY)
Facility: HOSPITAL | Age: 74
Discharge: HOME | End: 2024-05-16
Payer: MEDICARE

## 2024-05-16 VITALS
HEART RATE: 71 BPM | TEMPERATURE: 98.8 F | RESPIRATION RATE: 16 BRPM | WEIGHT: 132.94 LBS | BODY MASS INDEX: 25.1 KG/M2 | OXYGEN SATURATION: 94 % | SYSTOLIC BLOOD PRESSURE: 129 MMHG | HEIGHT: 61 IN | DIASTOLIC BLOOD PRESSURE: 74 MMHG

## 2024-05-16 DIAGNOSIS — I80.9 PHLEBITIS: Primary | ICD-10-CM

## 2024-05-16 PROCEDURE — 99281 EMR DPT VST MAYX REQ PHY/QHP: CPT

## 2024-05-16 ASSESSMENT — PAIN DESCRIPTION - LOCATION: LOCATION: HAND

## 2024-05-16 ASSESSMENT — PAIN DESCRIPTION - ORIENTATION: ORIENTATION: RIGHT

## 2024-05-16 ASSESSMENT — PAIN - FUNCTIONAL ASSESSMENT: PAIN_FUNCTIONAL_ASSESSMENT: 0-10

## 2024-05-16 ASSESSMENT — PAIN SCALES - GENERAL: PAINLEVEL_OUTOF10: 2

## 2024-05-16 NOTE — DISCHARGE INSTRUCTIONS
Follow-up with primary care physician outpatient in the next 1 to 2 days.  Return to the emergency department anytime with any new or worsening symptoms.

## 2024-05-16 NOTE — ED PROVIDER NOTES
HPI   Chief Complaint   Patient presents with    Hand Injury     Right hand pain, thumb injury, noticed vein that is causing pain and is dark in color. Patient takes blood thinners.        Patient is a 73-year-old female presenting to the emergency department for evaluation of right thumb pain.  Patient states she was lifting a lot of heavy boxes today and scraped the top of her thumb on one of the boxes and got immediate pain to the lateral side of the right thumb.  She states she noticed it began to swell up and turned blue and therefore was concerned.  She states she is on blood thinners after recent heart cath procedure in February and was concerned about consistent bleeding.  She states she never bled outside of her skin however noticed increased swelling and blood pooling under the skin and was concerned.  She denies any other trauma or injury.                            [place] Coma Scale Score: 15                     Patient History   Past Medical History:   Diagnosis Date    COPD (chronic obstructive pulmonary disease) (Multi)      Past Surgical History:   Procedure Laterality Date    CARDIAC CATHETERIZATION N/A 2024    Procedure: Left Heart Cath, No LV;  Surgeon: Addison Reaves MD;  Location: [facility] Cardiac [facility];  Service: Cardiovascular;  Laterality: N/A;  no pre auth required    OTHER SURGICAL HISTORY  2022    Appendectomy    OTHER SURGICAL HISTORY  2022     section    OTHER SURGICAL HISTORY  2022    Hernia repair    OTHER SURGICAL HISTORY  2022    Wrist surgery     No family history on file.  Social History     Tobacco Use    Smoking status: Former     Current packs/day: 0.00     Types: Cigarettes     Quit date:      Years since quittin.3    Smokeless tobacco: Never   Substance Use Topics    Alcohol use: Not on file    Drug use: Not on file       Physical Exam   ED Triage Vitals [24 1831]   Temperature Heart Rate Respirations BP   37.1 °C (98.8  °F) 76 16 133/66      Pulse Ox Temp Source Heart Rate Source Patient Position   (!) 93 % Oral Monitor --      BP Location FiO2 (%)     -- --       Physical Exam  Vitals and nursing note reviewed.     GENERAL APPEARANCE: This patient is in no acute respiratory distress. Awake and alert. Talking appropriately. Answering questions appropriately. No evidence of pressured speech.    VITAL SIGNS: As per the nurses' triage record.    HEENT: Normocephalic, atraumatic.    NECK:  full gross ROM during exam    MUSCULOSKELETAL:  Full gross active range of motion. Ambulating on own with no acute difficulties.  2+ strong and equal radial pulses bilaterally.  Full range of motion of the right thumb with no tenderness to palpation.    NEUROLOGICAL: Awake, alert and oriented x 3.    IMMUNOLOGICAL: No palpable lymphadenopathy or lymphatic streaking noted on visible skin.    DERM: Superficial vein appears to have burst on the lateral aspect of the right thumb causing some ecchymosis to the lateral aspect of the left thumb.  2+ strong and equal pulses.  No sign of neurovascular compromise as patient has less than 2-second cap refill.  No erythema, swelling, warmth, drainage, or bleeding.    PSYCH: mood and affect appear normal.      ED Course & MDM   Diagnoses as of 05/16/24 1916   Phlebitis       Medical Decision Making  **Disclaimer parts of this chart have been completed using voice recognition software. Please excuse any errors of transcription.     Evaluated this patient independently and my supervising physician was available for consultation.    HPI: Detailed above.    Exam: A medically appropriate exam performed, outlined above, given the known history and presentation.    History obtained from: Patient    Labs/Diagnostics: Not indicated at this time as patient has full range of motion of the right thumb with no tenderness to palpation    EMERGENCY DEPARTMENT COURSE and DIFFERENTIAL DIAGNOSIS/MDM:  Patient is a 73-year-old  "female presenting to the emergency department for evaluation of right thumb pain.  On physical exam vital signs remarkable for systolic hypertension but otherwise stable and patient is in no acute distress.  She has what appears to be a superficial phlebitis to the lateral aspect of the right thumb where a vein burst while she was picking up boxes and after she hit it on one of the boxes.  She has 2+ strong and equal radial pulses bilaterally with full range of motion of all fingers and wrist and appropriate cap refill.  Patient was advised to take Tylenol for any pain and treat the symptoms with some compression and elevation.  She will follow-up with primary care physician outpatient within the next 1 to 2 days.  She will return to the emergency department anytime with any new or worsening symptoms.    The patient presented with a chief complaint of right thumb pain. The differential diagnosis associated with this patient's presentation includes phlebitis, fracture, dislocation, sprain.     Vitals:    Vitals:    05/16/24 1831   BP: 133/66   Pulse: 76   Resp: 16   Temp: 37.1 °C (98.8 °F)   TempSrc: Oral   SpO2: (!) 93%   Weight: 60.3 kg (132 lb 15 oz)   Height: 1.549 m (5' 1\")     History Limited by:    None    Independent history obtained from:    None    External records reviewed:    None    Diagnostics interpreted by me:    None    Discussions with other clinicians:    None    Chronic conditions impacting care:    None    Social determinants of health affecting care:    None    Diagnostic tests considered but not performed: None    ED Medications managed:    Medications - No data to display    Prescription drugs considered:    None    Screenings:                Procedure  Procedures     Marissa Hernandez PA-C  05/16/24 1916    "

## 2024-05-17 ENCOUNTER — CLINICAL SUPPORT (OUTPATIENT)
Dept: CARDIAC REHAB | Facility: CLINIC | Age: 74
End: 2024-05-17
Payer: MEDICARE

## 2024-05-17 DIAGNOSIS — Z95.5 STENTED CORONARY ARTERY: ICD-10-CM

## 2024-05-17 PROCEDURE — 93798 PHYS/QHP OP CAR RHAB W/ECG: CPT | Performed by: INTERNAL MEDICINE

## 2024-05-20 ENCOUNTER — CLINICAL SUPPORT (OUTPATIENT)
Dept: CARDIAC REHAB | Facility: CLINIC | Age: 74
End: 2024-05-20
Payer: MEDICARE

## 2024-05-20 DIAGNOSIS — Z95.5 STENTED CORONARY ARTERY: ICD-10-CM

## 2024-05-20 PROCEDURE — 93798 PHYS/QHP OP CAR RHAB W/ECG: CPT | Performed by: INTERNAL MEDICINE

## 2024-05-24 ENCOUNTER — CLINICAL SUPPORT (OUTPATIENT)
Dept: CARDIAC REHAB | Facility: CLINIC | Age: 74
End: 2024-05-24
Payer: MEDICARE

## 2024-05-24 DIAGNOSIS — Z95.5 STENTED CORONARY ARTERY: ICD-10-CM

## 2024-05-24 PROCEDURE — 93798 PHYS/QHP OP CAR RHAB W/ECG: CPT | Performed by: INTERNAL MEDICINE

## 2024-05-31 ENCOUNTER — CLINICAL SUPPORT (OUTPATIENT)
Dept: CARDIAC REHAB | Facility: CLINIC | Age: 74
End: 2024-05-31
Payer: MEDICARE

## 2024-05-31 DIAGNOSIS — Z95.5 STENTED CORONARY ARTERY: ICD-10-CM

## 2024-05-31 PROCEDURE — 93798 PHYS/QHP OP CAR RHAB W/ECG: CPT | Performed by: INTERNAL MEDICINE

## 2024-06-03 ENCOUNTER — CLINICAL SUPPORT (OUTPATIENT)
Dept: CARDIAC REHAB | Facility: CLINIC | Age: 74
End: 2024-06-03
Payer: MEDICARE

## 2024-06-03 DIAGNOSIS — Z95.5 STENTED CORONARY ARTERY: ICD-10-CM

## 2024-06-07 ENCOUNTER — CLINICAL SUPPORT (OUTPATIENT)
Dept: CARDIAC REHAB | Facility: CLINIC | Age: 74
End: 2024-06-07
Payer: MEDICARE

## 2024-06-07 DIAGNOSIS — Z95.5 STENTED CORONARY ARTERY: ICD-10-CM

## 2024-06-07 PROCEDURE — 93798 PHYS/QHP OP CAR RHAB W/ECG: CPT | Performed by: INTERNAL MEDICINE

## 2024-06-10 ENCOUNTER — CLINICAL SUPPORT (OUTPATIENT)
Dept: CARDIAC REHAB | Facility: CLINIC | Age: 74
End: 2024-06-10
Payer: MEDICARE

## 2024-06-10 DIAGNOSIS — Z95.5 STENTED CORONARY ARTERY: ICD-10-CM

## 2024-06-10 PROCEDURE — 93798 PHYS/QHP OP CAR RHAB W/ECG: CPT | Performed by: INTERNAL MEDICINE

## 2024-06-14 ENCOUNTER — CLINICAL SUPPORT (OUTPATIENT)
Dept: CARDIAC REHAB | Facility: CLINIC | Age: 74
End: 2024-06-14
Payer: MEDICARE

## 2024-06-14 DIAGNOSIS — Z95.5 STENTED CORONARY ARTERY: ICD-10-CM

## 2024-06-17 ENCOUNTER — CLINICAL SUPPORT (OUTPATIENT)
Dept: CARDIAC REHAB | Facility: CLINIC | Age: 74
End: 2024-06-17
Payer: MEDICARE

## 2024-06-17 DIAGNOSIS — Z95.5 STENTED CORONARY ARTERY: ICD-10-CM

## 2024-06-21 ENCOUNTER — CLINICAL SUPPORT (OUTPATIENT)
Dept: CARDIAC REHAB | Facility: CLINIC | Age: 74
End: 2024-06-21
Payer: MEDICARE

## 2024-06-21 DIAGNOSIS — Z95.5 STENTED CORONARY ARTERY: ICD-10-CM

## 2024-06-21 PROCEDURE — 93798 PHYS/QHP OP CAR RHAB W/ECG: CPT | Performed by: INTERNAL MEDICINE

## 2024-06-24 ENCOUNTER — CLINICAL SUPPORT (OUTPATIENT)
Dept: CARDIAC REHAB | Facility: CLINIC | Age: 74
End: 2024-06-24
Payer: MEDICARE

## 2024-06-24 DIAGNOSIS — Z95.5 STENTED CORONARY ARTERY: ICD-10-CM

## 2024-06-24 DIAGNOSIS — E55.9 VITAMIN D DEFICIENCY, UNSPECIFIED: ICD-10-CM

## 2024-06-24 DIAGNOSIS — R89.9 UNSPECIFIED ABNORMAL FINDING IN SPECIMENS FROM OTHER ORGANS, SYSTEMS AND TISSUES: Primary | ICD-10-CM

## 2024-06-24 PROCEDURE — 93798 PHYS/QHP OP CAR RHAB W/ECG: CPT | Performed by: INTERNAL MEDICINE

## 2024-06-25 ENCOUNTER — LAB (OUTPATIENT)
Dept: LAB | Facility: LAB | Age: 74
End: 2024-06-25
Payer: MEDICARE

## 2024-06-25 DIAGNOSIS — E78.5 HYPERLIPIDEMIA, UNSPECIFIED: Primary | ICD-10-CM

## 2024-06-25 DIAGNOSIS — R89.9 UNSPECIFIED ABNORMAL FINDING IN SPECIMENS FROM OTHER ORGANS, SYSTEMS AND TISSUES: ICD-10-CM

## 2024-06-25 DIAGNOSIS — E55.9 VITAMIN D DEFICIENCY, UNSPECIFIED: ICD-10-CM

## 2024-06-25 LAB
ALBUMIN SERPL BCP-MCNC: 4.4 G/DL (ref 3.4–5)
ALP SERPL-CCNC: 57 U/L (ref 33–136)
ALT SERPL W P-5'-P-CCNC: 24 U/L (ref 7–45)
ANION GAP SERPL CALC-SCNC: 15 MMOL/L (ref 10–20)
AST SERPL W P-5'-P-CCNC: 30 U/L (ref 9–39)
BILIRUB SERPL-MCNC: 1.2 MG/DL (ref 0–1.2)
BUN SERPL-MCNC: 11 MG/DL (ref 6–23)
CALCIUM SERPL-MCNC: 9.3 MG/DL (ref 8.6–10.3)
CHLORIDE SERPL-SCNC: 104 MMOL/L (ref 98–107)
CHOLEST SERPL-MCNC: 137 MG/DL (ref 0–199)
CHOLESTEROL/HDL RATIO: 2.5
CO2 SERPL-SCNC: 30 MMOL/L (ref 21–32)
CREAT SERPL-MCNC: 0.61 MG/DL (ref 0.5–1.05)
EGFRCR SERPLBLD CKD-EPI 2021: >90 ML/MIN/1.73M*2
GLUCOSE SERPL-MCNC: 88 MG/DL (ref 74–99)
HDLC SERPL-MCNC: 55.2 MG/DL
LDLC SERPL CALC-MCNC: 62 MG/DL
NON HDL CHOLESTEROL: 82 MG/DL (ref 0–149)
POTASSIUM SERPL-SCNC: 4 MMOL/L (ref 3.5–5.3)
PROT SERPL-MCNC: 6.4 G/DL (ref 6.4–8.2)
SODIUM SERPL-SCNC: 145 MMOL/L (ref 136–145)
TRIGL SERPL-MCNC: 97 MG/DL (ref 0–149)
VLDL: 19 MG/DL (ref 0–40)

## 2024-06-25 PROCEDURE — 82306 VITAMIN D 25 HYDROXY: CPT

## 2024-06-25 PROCEDURE — 80053 COMPREHEN METABOLIC PANEL: CPT

## 2024-06-25 PROCEDURE — 36415 COLL VENOUS BLD VENIPUNCTURE: CPT

## 2024-06-25 PROCEDURE — 80061 LIPID PANEL: CPT

## 2024-06-26 LAB — 25(OH)D3 SERPL-MCNC: 59 NG/ML (ref 30–100)

## 2024-06-28 ENCOUNTER — CLINICAL SUPPORT (OUTPATIENT)
Dept: CARDIAC REHAB | Facility: CLINIC | Age: 74
End: 2024-06-28
Payer: MEDICARE

## 2024-06-28 DIAGNOSIS — Z95.5 STENTED CORONARY ARTERY: ICD-10-CM

## 2024-06-28 PROCEDURE — 93798 PHYS/QHP OP CAR RHAB W/ECG: CPT | Performed by: INTERNAL MEDICINE

## 2024-07-01 ENCOUNTER — CLINICAL SUPPORT (OUTPATIENT)
Dept: CARDIAC REHAB | Facility: CLINIC | Age: 74
End: 2024-07-01
Payer: MEDICARE

## 2024-07-01 DIAGNOSIS — Z95.5 STENTED CORONARY ARTERY: ICD-10-CM

## 2024-07-01 PROCEDURE — 93798 PHYS/QHP OP CAR RHAB W/ECG: CPT | Performed by: INTERNAL MEDICINE

## 2024-07-05 ENCOUNTER — APPOINTMENT (OUTPATIENT)
Dept: CARDIAC REHAB | Facility: CLINIC | Age: 74
End: 2024-07-05
Payer: MEDICARE

## 2024-07-08 ENCOUNTER — CLINICAL SUPPORT (OUTPATIENT)
Dept: CARDIAC REHAB | Facility: CLINIC | Age: 74
End: 2024-07-08
Payer: MEDICARE

## 2024-07-08 DIAGNOSIS — Z95.5 STENTED CORONARY ARTERY: ICD-10-CM

## 2024-07-12 ENCOUNTER — CLINICAL SUPPORT (OUTPATIENT)
Dept: CARDIAC REHAB | Facility: CLINIC | Age: 74
End: 2024-07-12
Payer: MEDICARE

## 2024-07-12 DIAGNOSIS — Z95.5 STENTED CORONARY ARTERY: ICD-10-CM

## 2024-07-12 PROCEDURE — 93798 PHYS/QHP OP CAR RHAB W/ECG: CPT | Performed by: INTERNAL MEDICINE

## 2024-07-15 ENCOUNTER — CLINICAL SUPPORT (OUTPATIENT)
Dept: CARDIAC REHAB | Facility: CLINIC | Age: 74
End: 2024-07-15
Payer: MEDICARE

## 2024-07-15 DIAGNOSIS — Z95.5 STENTED CORONARY ARTERY: ICD-10-CM

## 2024-07-15 PROCEDURE — 93798 PHYS/QHP OP CAR RHAB W/ECG: CPT | Performed by: INTERNAL MEDICINE

## 2024-07-19 ENCOUNTER — CLINICAL SUPPORT (OUTPATIENT)
Dept: CARDIAC REHAB | Facility: CLINIC | Age: 74
End: 2024-07-19
Payer: MEDICARE

## 2024-07-19 DIAGNOSIS — Z95.5 STENTED CORONARY ARTERY: ICD-10-CM

## 2024-07-19 PROCEDURE — 93798 PHYS/QHP OP CAR RHAB W/ECG: CPT | Performed by: INTERNAL MEDICINE

## 2024-07-22 ENCOUNTER — CLINICAL SUPPORT (OUTPATIENT)
Dept: CARDIAC REHAB | Facility: CLINIC | Age: 74
End: 2024-07-22
Payer: MEDICARE

## 2024-07-22 DIAGNOSIS — Z95.5 STENTED CORONARY ARTERY: ICD-10-CM

## 2024-07-22 PROCEDURE — 93798 PHYS/QHP OP CAR RHAB W/ECG: CPT | Performed by: INTERNAL MEDICINE

## 2024-07-26 ENCOUNTER — APPOINTMENT (OUTPATIENT)
Dept: CARDIAC REHAB | Facility: CLINIC | Age: 74
End: 2024-07-26
Payer: MEDICARE

## 2024-07-29 ENCOUNTER — CLINICAL SUPPORT (OUTPATIENT)
Dept: CARDIAC REHAB | Facility: CLINIC | Age: 74
End: 2024-07-29
Payer: MEDICARE

## 2024-07-29 DIAGNOSIS — Z95.5 STENTED CORONARY ARTERY: ICD-10-CM

## 2024-07-29 PROCEDURE — 93798 PHYS/QHP OP CAR RHAB W/ECG: CPT | Performed by: INTERNAL MEDICINE

## 2024-08-02 ENCOUNTER — CLINICAL SUPPORT (OUTPATIENT)
Dept: CARDIAC REHAB | Facility: CLINIC | Age: 74
End: 2024-08-02
Payer: MEDICARE

## 2024-08-02 DIAGNOSIS — Z95.5 STENTED CORONARY ARTERY: ICD-10-CM

## 2024-08-02 PROCEDURE — 93798 PHYS/QHP OP CAR RHAB W/ECG: CPT | Performed by: INTERNAL MEDICINE

## 2024-08-05 ENCOUNTER — CLINICAL SUPPORT (OUTPATIENT)
Dept: CARDIAC REHAB | Facility: CLINIC | Age: 74
End: 2024-08-05
Payer: MEDICARE

## 2024-08-05 DIAGNOSIS — Z95.5 STENTED CORONARY ARTERY: ICD-10-CM

## 2024-08-05 PROCEDURE — 93798 PHYS/QHP OP CAR RHAB W/ECG: CPT | Performed by: INTERNAL MEDICINE

## 2024-08-09 ENCOUNTER — CLINICAL SUPPORT (OUTPATIENT)
Dept: CARDIAC REHAB | Facility: CLINIC | Age: 74
End: 2024-08-09
Payer: MEDICARE

## 2024-08-09 DIAGNOSIS — Z95.5 STENTED CORONARY ARTERY: ICD-10-CM

## 2024-08-12 ENCOUNTER — CLINICAL SUPPORT (OUTPATIENT)
Dept: CARDIAC REHAB | Facility: CLINIC | Age: 74
End: 2024-08-12
Payer: MEDICARE

## 2024-08-12 DIAGNOSIS — Z95.5 STENTED CORONARY ARTERY: ICD-10-CM

## 2024-08-16 ENCOUNTER — APPOINTMENT (OUTPATIENT)
Dept: CARDIAC REHAB | Facility: CLINIC | Age: 74
End: 2024-08-16
Payer: MEDICARE

## 2024-08-16 DIAGNOSIS — Z95.5 STENTED CORONARY ARTERY: ICD-10-CM

## 2024-08-16 PROCEDURE — 93798 PHYS/QHP OP CAR RHAB W/ECG: CPT | Performed by: INTERNAL MEDICINE

## 2024-08-19 ENCOUNTER — APPOINTMENT (OUTPATIENT)
Dept: CARDIAC REHAB | Facility: CLINIC | Age: 74
End: 2024-08-19
Payer: MEDICARE

## 2024-08-19 DIAGNOSIS — Z95.5 STENTED CORONARY ARTERY: ICD-10-CM

## 2024-08-19 PROCEDURE — 93798 PHYS/QHP OP CAR RHAB W/ECG: CPT | Performed by: INTERNAL MEDICINE

## 2024-08-23 ENCOUNTER — APPOINTMENT (OUTPATIENT)
Dept: CARDIAC REHAB | Facility: CLINIC | Age: 74
End: 2024-08-23
Payer: MEDICARE

## 2024-08-23 DIAGNOSIS — Z95.5 STENTED CORONARY ARTERY: ICD-10-CM

## 2024-08-23 PROCEDURE — 93798 PHYS/QHP OP CAR RHAB W/ECG: CPT | Performed by: INTERNAL MEDICINE

## 2024-08-26 ENCOUNTER — APPOINTMENT (OUTPATIENT)
Dept: CARDIAC REHAB | Facility: CLINIC | Age: 74
End: 2024-08-26
Payer: MEDICARE

## 2024-08-26 DIAGNOSIS — Z95.5 STENTED CORONARY ARTERY: ICD-10-CM

## 2024-08-26 PROCEDURE — 93798 PHYS/QHP OP CAR RHAB W/ECG: CPT | Performed by: INTERNAL MEDICINE

## 2024-08-30 ENCOUNTER — APPOINTMENT (OUTPATIENT)
Dept: CARDIAC REHAB | Facility: CLINIC | Age: 74
End: 2024-08-30
Payer: MEDICARE

## 2024-10-16 ENCOUNTER — APPOINTMENT (OUTPATIENT)
Dept: CARDIOLOGY | Facility: HOSPITAL | Age: 74
End: 2024-10-16
Payer: MEDICARE

## 2024-10-16 ENCOUNTER — APPOINTMENT (OUTPATIENT)
Dept: RADIOLOGY | Facility: HOSPITAL | Age: 74
End: 2024-10-16
Payer: MEDICARE

## 2024-10-16 ENCOUNTER — HOSPITAL ENCOUNTER (EMERGENCY)
Facility: HOSPITAL | Age: 74
Discharge: HOME | End: 2024-10-16
Attending: EMERGENCY MEDICINE
Payer: MEDICARE

## 2024-10-16 VITALS
OXYGEN SATURATION: 95 % | DIASTOLIC BLOOD PRESSURE: 70 MMHG | BODY MASS INDEX: 24.97 KG/M2 | RESPIRATION RATE: 16 BRPM | HEART RATE: 75 BPM | HEIGHT: 61 IN | TEMPERATURE: 98.2 F | SYSTOLIC BLOOD PRESSURE: 120 MMHG | WEIGHT: 132.28 LBS

## 2024-10-16 DIAGNOSIS — R07.9 CHEST PAIN, UNSPECIFIED TYPE: Primary | ICD-10-CM

## 2024-10-16 LAB
ALBUMIN SERPL BCP-MCNC: 4.3 G/DL (ref 3.4–5)
ALP SERPL-CCNC: 58 U/L (ref 33–136)
ALT SERPL W P-5'-P-CCNC: 20 U/L (ref 7–45)
ANION GAP SERPL CALCULATED.3IONS-SCNC: 12 MMOL/L (ref 10–20)
APPEARANCE UR: CLEAR
AST SERPL W P-5'-P-CCNC: 32 U/L (ref 9–39)
ATRIAL RATE: 68 BPM
BASOPHILS # BLD AUTO: 0.06 X10*3/UL (ref 0–0.1)
BASOPHILS NFR BLD AUTO: 0.8 %
BILIRUB SERPL-MCNC: 1.2 MG/DL (ref 0–1.2)
BILIRUB UR STRIP.AUTO-MCNC: NEGATIVE MG/DL
BUN SERPL-MCNC: 15 MG/DL (ref 6–23)
CALCIUM SERPL-MCNC: 9.2 MG/DL (ref 8.6–10.3)
CARDIAC TROPONIN I PNL SERPL HS: 7 NG/L (ref 0–13)
CARDIAC TROPONIN I PNL SERPL HS: 9 NG/L (ref 0–13)
CHLORIDE SERPL-SCNC: 105 MMOL/L (ref 98–107)
CO2 SERPL-SCNC: 26 MMOL/L (ref 21–32)
COLOR UR: COLORLESS
CREAT SERPL-MCNC: 0.55 MG/DL (ref 0.5–1.05)
EGFRCR SERPLBLD CKD-EPI 2021: >90 ML/MIN/1.73M*2
EOSINOPHIL # BLD AUTO: 0.24 X10*3/UL (ref 0–0.4)
EOSINOPHIL NFR BLD AUTO: 3.2 %
ERYTHROCYTE [DISTWIDTH] IN BLOOD BY AUTOMATED COUNT: 13.1 % (ref 11.5–14.5)
GLUCOSE SERPL-MCNC: 99 MG/DL (ref 74–99)
GLUCOSE UR STRIP.AUTO-MCNC: NORMAL MG/DL
HCT VFR BLD AUTO: 48.1 % (ref 36–46)
HGB BLD-MCNC: 15.7 G/DL (ref 12–16)
IMM GRANULOCYTES # BLD AUTO: 0.02 X10*3/UL (ref 0–0.5)
IMM GRANULOCYTES NFR BLD AUTO: 0.3 % (ref 0–0.9)
KETONES UR STRIP.AUTO-MCNC: NEGATIVE MG/DL
LEUKOCYTE ESTERASE UR QL STRIP.AUTO: NEGATIVE
LIPASE SERPL-CCNC: 41 U/L (ref 9–82)
LYMPHOCYTES # BLD AUTO: 1.9 X10*3/UL (ref 0.8–3)
LYMPHOCYTES NFR BLD AUTO: 25.6 %
MAGNESIUM SERPL-MCNC: 1.96 MG/DL (ref 1.6–2.4)
MCH RBC QN AUTO: 30.8 PG (ref 26–34)
MCHC RBC AUTO-ENTMCNC: 32.6 G/DL (ref 32–36)
MCV RBC AUTO: 95 FL (ref 80–100)
MONOCYTES # BLD AUTO: 0.62 X10*3/UL (ref 0.05–0.8)
MONOCYTES NFR BLD AUTO: 8.3 %
NEUTROPHILS # BLD AUTO: 4.59 X10*3/UL (ref 1.6–5.5)
NEUTROPHILS NFR BLD AUTO: 61.8 %
NITRITE UR QL STRIP.AUTO: NEGATIVE
NRBC BLD-RTO: 0 /100 WBCS (ref 0–0)
P AXIS: 77 DEGREES
P OFFSET: 205 MS
P ONSET: 140 MS
PH UR STRIP.AUTO: 5.5 [PH]
PLATELET # BLD AUTO: 304 X10*3/UL (ref 150–450)
POTASSIUM SERPL-SCNC: 4.3 MMOL/L (ref 3.5–5.3)
PR INTERVAL: 178 MS
PROT SERPL-MCNC: 6.9 G/DL (ref 6.4–8.2)
PROT UR STRIP.AUTO-MCNC: NEGATIVE MG/DL
Q ONSET: 229 MS
QRS COUNT: 11 BEATS
QRS DURATION: 88 MS
QT INTERVAL: 376 MS
QTC CALCULATION(BAZETT): 399 MS
QTC FREDERICIA: 392 MS
R AXIS: 27 DEGREES
RBC # BLD AUTO: 5.09 X10*6/UL (ref 4–5.2)
RBC # UR STRIP.AUTO: NEGATIVE /UL
SODIUM SERPL-SCNC: 139 MMOL/L (ref 136–145)
SP GR UR STRIP.AUTO: 1
T AXIS: 78 DEGREES
T OFFSET: 417 MS
TSH SERPL-ACNC: 2.3 MIU/L (ref 0.44–3.98)
UROBILINOGEN UR STRIP.AUTO-MCNC: NORMAL MG/DL
VENTRICULAR RATE: 68 BPM
WBC # BLD AUTO: 7.4 X10*3/UL (ref 4.4–11.3)

## 2024-10-16 PROCEDURE — 81003 URINALYSIS AUTO W/O SCOPE: CPT | Performed by: PHYSICIAN ASSISTANT

## 2024-10-16 PROCEDURE — 84484 ASSAY OF TROPONIN QUANT: CPT | Performed by: EMERGENCY MEDICINE

## 2024-10-16 PROCEDURE — 36415 COLL VENOUS BLD VENIPUNCTURE: CPT | Performed by: EMERGENCY MEDICINE

## 2024-10-16 PROCEDURE — 36415 COLL VENOUS BLD VENIPUNCTURE: CPT | Performed by: PHYSICIAN ASSISTANT

## 2024-10-16 PROCEDURE — 93971 EXTREMITY STUDY: CPT | Performed by: RADIOLOGY

## 2024-10-16 PROCEDURE — 99285 EMERGENCY DEPT VISIT HI MDM: CPT | Mod: 25

## 2024-10-16 PROCEDURE — 85025 COMPLETE CBC W/AUTO DIFF WBC: CPT | Performed by: EMERGENCY MEDICINE

## 2024-10-16 PROCEDURE — 83735 ASSAY OF MAGNESIUM: CPT | Performed by: PHYSICIAN ASSISTANT

## 2024-10-16 PROCEDURE — 74176 CT ABD & PELVIS W/O CONTRAST: CPT | Performed by: RADIOLOGY

## 2024-10-16 PROCEDURE — 71045 X-RAY EXAM CHEST 1 VIEW: CPT

## 2024-10-16 PROCEDURE — 84443 ASSAY THYROID STIM HORMONE: CPT | Performed by: PHYSICIAN ASSISTANT

## 2024-10-16 PROCEDURE — 83690 ASSAY OF LIPASE: CPT | Performed by: PHYSICIAN ASSISTANT

## 2024-10-16 PROCEDURE — 74176 CT ABD & PELVIS W/O CONTRAST: CPT

## 2024-10-16 PROCEDURE — 84484 ASSAY OF TROPONIN QUANT: CPT | Performed by: PHYSICIAN ASSISTANT

## 2024-10-16 PROCEDURE — 71045 X-RAY EXAM CHEST 1 VIEW: CPT | Performed by: RADIOLOGY

## 2024-10-16 PROCEDURE — 93005 ELECTROCARDIOGRAM TRACING: CPT

## 2024-10-16 PROCEDURE — 84075 ASSAY ALKALINE PHOSPHATASE: CPT | Performed by: EMERGENCY MEDICINE

## 2024-10-16 PROCEDURE — 93971 EXTREMITY STUDY: CPT

## 2024-10-16 ASSESSMENT — PAIN DESCRIPTION - LOCATION: LOCATION: CHEST

## 2024-10-16 ASSESSMENT — PAIN SCALES - GENERAL
PAINLEVEL_OUTOF10: 3
PAINLEVEL_OUTOF10: 3
PAINLEVEL_OUTOF10: 0 - NO PAIN
PAINLEVEL_OUTOF10: 3

## 2024-10-16 ASSESSMENT — COLUMBIA-SUICIDE SEVERITY RATING SCALE - C-SSRS
6. HAVE YOU EVER DONE ANYTHING, STARTED TO DO ANYTHING, OR PREPARED TO DO ANYTHING TO END YOUR LIFE?: NO
2. HAVE YOU ACTUALLY HAD ANY THOUGHTS OF KILLING YOURSELF?: NO
1. IN THE PAST MONTH, HAVE YOU WISHED YOU WERE DEAD OR WISHED YOU COULD GO TO SLEEP AND NOT WAKE UP?: NO

## 2024-10-16 ASSESSMENT — PAIN DESCRIPTION - DESCRIPTORS
DESCRIPTORS: BURNING
DESCRIPTORS: BURNING

## 2024-10-16 ASSESSMENT — PAIN - FUNCTIONAL ASSESSMENT: PAIN_FUNCTIONAL_ASSESSMENT: 0-10

## 2024-10-16 NOTE — ED PROVIDER NOTES
HPI   Chief Complaint   Patient presents with    Chest Pain     Coming from md office with chest pain and hacing a cold feeling in her chest along with neck pain. Does have cardiac history. Lower abd pain with thoughts of UTI or kidney stones        HPI  74-year-old female coming in after being evaluated at a primary care provider's office and recommended she come in for complaint of some chest pain that radiates to the left neck.  Also complaining of some left calf pain.  She says that she has had symptoms for months.  And that is why she went to go see her family doctor but upon being evaluated her family doctor her heart rate was little bit low and they sent her in for assessment.  She is not complaining of any shortness of breath.  She says that she denies any fall injury or trauma.  She also states that she was having some left-sided flank pain that she attributed to either urinary tract infection or possible kidney stone.      Patient History   Past Medical History:   Diagnosis Date    COPD (chronic obstructive pulmonary disease) (Multi)      Past Surgical History:   Procedure Laterality Date    CARDIAC CATHETERIZATION N/A 2024    Procedure: Left Heart Cath, No LV;  Surgeon: Addison Reaves MD;  Location: [facility] Card[facility] Lab;  Service: Cardiovascular;  Laterality: N/A;  no pre auth required    OTHER SURGICAL HISTORY  2022    Appendectomy    OTHER SURGICAL HISTORY  2022     section    OTHER SURGICAL HISTORY  2022    Hernia repair    OTHER SURGICAL HISTORY  2022    Wrist surgery     No family history on file.  Social History     Tobacco Use    Smoking status: Former     Current packs/day: 0.00     Types: Cigarettes     Quit date:      Years since quittin.8    Smokeless tobacco: Never   Substance Use Topics    Alcohol use: Not on file    Drug use: Not on file       Physical Exam   ED Triage Vitals   Temperature Heart Rate Respirations BP   10/16/24 1235 10/16/24  1237 10/16/24 1235 10/16/24 1245   36.6 °C (97.9 °F) (!) 43 16 149/75      Pulse Ox Temp Source Heart Rate Source Patient Position   10/16/24 1237 10/16/24 1235 10/16/24 1237 --   98 % Temporal Monitor       BP Location FiO2 (%)     -- --             Physical Exam  PHYSICAL EXAMINATION    GENERAL APPEARANCE: Awake and alert.     VITAL SIGNS: As per the nurses' triage record.     HEENT: Normocephalic, atraumatic. Extraocular muscles are intact. Pupils equal round and reactive to light. Conjunctiva are pink. Negative scleral icterus. Mucous membranes are moist. Tongue in the midline. Pharynx was without erythema or exudates, uvula midline    NECK: Soft Nontender and supple, full gross ROM, no meningeal signs.    CHEST: Nontender to palpation. Clear to auscultation bilaterally. No rales, rhonchi, or wheezing.     HEART: S1, S2. Regular rate and rhythm. No murmurs, gallops or rubs.  Strong and equal pulses in the extremities.     ABDOMEN: Soft, nontender, nondistended, positive bowel sounds, no palpable masses.    MUSCULOSKELETAL: The calves are nontender to palpation. Full gross active range of motion. Ambulating on own with no acute difficulties     NEUROLOGICAL: Awake, alert and oriented x 3. Power intact in the upper and lower extremities. Sensation is intact to light touch in the upper and lower extremities.     IMMUNOLOGICAL: No lymphatic streaking noted     DERM: No petechiae, rashes, or ecchymoses.    ED Course & MDM   ED Course as of 10/16/24 1613   Wed Oct 16, 2024   1346 EKG personally interpreted by me performed at 1259  Sinus rhythm with frequent unifocal PVCs in a bigeminy pattern ventricular rate 68 normal axis no acute ischemic changes [EF]   1609 Patient is feeling well, no current distress, feels comfortable home-going plan, PCP follow-up [AP]      ED Course User Index  [AP] Ryne Long PA-C  [EF] Yesika Conley DO         Diagnoses as of 10/16/24 1613   Chest pain, unspecified type                  No data recorded     Steffanie Coma Scale Score: 15 (10/16/24 1235 : Diana Zuniga, EVELYN)                           Medical Decision Making  Parts of this chart have been completed using voice recognition software. Please excuse any errors of transcription.  My thought process and reason for plan has been formulated from the time that I saw the patient until the time of disposition and is not specific to one specific moment during their visit and furthermore my MDM encompasses this entire chart and not only this text box.      HPI: Detailed above.    Exam: A medically appropriate exam performed, outlined above, given the known history and presentation.    History Limited by: Nothing    History obtained from: Patient    External/internal records reviewed: No external records reviewed    Social Determinants of Health considered during this visit: Lives at home    Chronic conditions impacting care: Denies    Medications given during visit:  Medications - No data to display     Diagnostic/tests  Labs Reviewed   CBC WITH AUTO DIFFERENTIAL - Abnormal       Result Value    WBC 7.4      nRBC 0.0      RBC 5.09      Hemoglobin 15.7      Hematocrit 48.1 (*)     MCV 95      MCH 30.8      MCHC 32.6      RDW 13.1      Platelets 304      Neutrophils % 61.8      Immature Granulocytes %, Automated 0.3      Lymphocytes % 25.6      Monocytes % 8.3      Eosinophils % 3.2      Basophils % 0.8      Neutrophils Absolute 4.59      Immature Granulocytes Absolute, Automated 0.02      Lymphocytes Absolute 1.90      Monocytes Absolute 0.62      Eosinophils Absolute 0.24      Basophils Absolute 0.06     URINALYSIS WITH REFLEX CULTURE AND MICROSCOPIC - Abnormal    Color, Urine Colorless (*)     Appearance, Urine Clear      Specific Gravity, Urine 1.002 (*)     pH, Urine 5.5      Protein, Urine NEGATIVE      Glucose, Urine Normal      Blood, Urine NEGATIVE      Ketones, Urine NEGATIVE      Bilirubin, Urine NEGATIVE      Urobilinogen,  Urine Normal      Nitrite, Urine NEGATIVE      Leukocyte Esterase, Urine NEGATIVE     COMPREHENSIVE METABOLIC PANEL - Normal    Glucose 99      Sodium 139      Potassium 4.3      Chloride 105      Bicarbonate 26      Anion Gap 12      Urea Nitrogen 15      Creatinine 0.55      eGFR >90      Calcium 9.2      Albumin 4.3      Alkaline Phosphatase 58      Total Protein 6.9      AST 32      Bilirubin, Total 1.2      ALT 20     TROPONIN I, HIGH SENSITIVITY - Normal    Troponin I, High Sensitivity 7      Narrative:     Less than 99th percentile of normal range cutoff-  Female and children under 18 years old <14 ng/L; Male <21 ng/L: Negative  Repeat testing should be performed if clinically indicated.     Female and children under 18 years old 14-50 ng/L; Male 21-50 ng/L:  Consistent with possible cardiac damage and possible increased clinical   risk. Serial measurements may help to assess extent of myocardial damage.     >50 ng/L: Consistent with cardiac damage, increased clinical risk and  myocardial infarction. Serial measurements may help assess extent of   myocardial damage.      NOTE: Children less than 1 year old may have higher baseline troponin   levels and results should be interpreted in conjunction with the overall   clinical context.     NOTE: Troponin I testing is performed using a different   testing methodology at [facility] than at [facility]. Direct result comparisons should only   be made within the same method.   LIPASE - Normal    Lipase 41      Narrative:     Venipuncture immediately after or during the administration of Metamizole may lead to falsely low results. Testing should be performed immediately prior to Metamizole dosing.   MAGNESIUM - Normal    Magnesium 1.96     TSH WITH REFLEX TO FREE T4 IF ABNORMAL - Normal    Thyroid Stimulating Hormone 2.30      Narrative:     TSH testing is performed using different testing methodology at [facility] than at  [facility]. Direct result comparisons should only be made within the same method.     SERIAL TROPONIN-INITIAL - Normal    Troponin I, High Sensitivity 9      Narrative:     Less than 99th percentile of normal range cutoff-  Female and children under 18 years old <14 ng/L; Male <21 ng/L: Negative  Repeat testing should be performed if clinically indicated.     Female and children under 18 years old 14-50 ng/L; Male 21-50 ng/L:  Consistent with possible cardiac damage and possible increased clinical   risk. Serial measurements may help to assess extent of myocardial damage.     >50 ng/L: Consistent with cardiac damage, increased clinical risk and  myocardial infarction. Serial measurements may help assess extent of   myocardial damage.      NOTE: Children less than 1 year old may have higher baseline troponin   levels and results should be interpreted in conjunction with the overall   clinical context.     NOTE: Troponin I testing is performed using a different   testing methodology at [facility] than at [facility]. Direct result comparisons should only   be made within the same method.   TROPONIN SERIES- (INITIAL, 1 HR)    Narrative:     The following orders were created for panel order Troponin I Series, High Sensitivity (0, 1 HR).  Procedure                               Abnormality         Status                     ---------                               -----------         ------                     Troponin I, High Sensiti...[[id number]]  Normal              Final result               Troponin, High Sensitivi...[[id number]]                                                   Please view results for these tests on the individual orders.   URINALYSIS WITH REFLEX CULTURE AND MICROSCOPIC    Narrative:     The following orders were created for panel order Urinalysis with Reflex Culture and Microscopic.  Procedure                               Abnormality         Status                      ---------                               -----------         ------                     Urinalysis with Reflex C...[[id number]]  Abnormal            Final result               Extra Urine Gray Tube[[id number]]                                                         Please view results for these tests on the individual orders.   EXTRA URINE GRAY TUBE   SERIAL TROPONIN, 1 HOUR      CT abdomen pelvis wo IV contrast   Final Result   1.  Stable 4 mm left lower lobe pulmonary nodule with 3 mm right   lower lobe pulmonary nodule.   2. Stable multiple hepatic cysts.   3. 2 mm nonobstructing right renal calculus with 7 mm nonobstructing   left renal calculus. No obstructive uropathy on either side.   4. Minimal colonic diverticulosis.   5. Several small ventral hernias with very small left inguinal hernia.             MACRO:   None        Signed by: Jaelyn Rendon 10/16/2024 3:06 PM   Dictation workstation:   [id number]      Lower extremity venous duplex left   Final Result   Normal left leg Doppler ultrasound.        Signed by: Eusebio Frey 10/16/2024 2:29 PM   Dictation workstation:   [id number]      XR chest 1 view   Final Result   1. Bibasilar patchy airspace disease worse on the left. Underlying   pneumonia in the differential along with atelectasis.   2. Emphysematous changes in the lungs                  MACRO:   None        Signed by: Waqas Marrero 10/16/2024 1:21 PM   Dictation workstation:   [id number]           EKG: Obtained read by attending physician reviewed myself and per my interpretation no sign of STEMI criteria        Considerations/further MDM:  I estimate there is low risk for acute coronary syndrome including MI, intracranial hemorrhage, cardiac dysrhythmia, hypertension, tamponade, pneumothorax, hemothorax, pulmonary embolism, sepsis, intracranial hemorrhage, dissection, pneumonia, respiratory distress or compromise, pericardial effusion,  thus I consider the discharge disposition  reasonable. We have discussed the diagnosis and risks, and we agree with discharging home to follow-up with their primary doctor or specialist as discussed and as provided.  We also discussed returning to the Emergency Department immediately if new or worsening symptoms occur. We have discussed the symptoms which are most concerning (e.g., bloody sputum, fever, worsening pain or shortness of breath, vomiting, weakness) that necessitate immediate return.    Multiple negative troponins and reassuring EKG making ACS very unlikely scenario, not tachycardic, not hypoxic, not having pleuritic pain and with comparison to the HPI I believe that pulmonary embolism is clinically unlikely.  Patient also indicates a somewhat chronic presentation of such symptoms of which I believe that she would best benefit from PCP follow-up for further evaluation, return precaution follow-up instructions to the ED discussed.    Patient has been seen in conjunction with attending physician Dr. Conley      Procedure  Procedures     Ryne Long PA-C  10/16/24 5871

## 2024-10-22 LAB
ATRIAL RATE: 68 BPM
P AXIS: 77 DEGREES
P OFFSET: 205 MS
P ONSET: 140 MS
PR INTERVAL: 178 MS
Q ONSET: 229 MS
QRS COUNT: 11 BEATS
QRS DURATION: 88 MS
QT INTERVAL: 376 MS
QTC CALCULATION(BAZETT): 399 MS
QTC FREDERICIA: 392 MS
R AXIS: 27 DEGREES
T AXIS: 78 DEGREES
T OFFSET: 417 MS
VENTRICULAR RATE: 68 BPM

## 2024-11-05 ENCOUNTER — HOSPITAL ENCOUNTER (OUTPATIENT)
Dept: RADIOLOGY | Facility: HOSPITAL | Age: 74
Discharge: HOME | End: 2024-11-05
Payer: MEDICARE

## 2024-11-05 ENCOUNTER — LAB (OUTPATIENT)
Dept: LAB | Facility: LAB | Age: 74
End: 2024-11-05
Payer: MEDICARE

## 2024-11-05 DIAGNOSIS — S00.86XA INSECT BITE (NONVENOMOUS) OF OTHER PART OF HEAD, INITIAL ENCOUNTER: Primary | ICD-10-CM
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
## 2024-11-05 DIAGNOSIS — I65.23 OCCLUSION AND STENOSIS OF BILATERAL CAROTID ARTERIES: ICD-10-CM

## 2024-11-05 DIAGNOSIS — R51.9 HEADACHE, UNSPECIFIED: ICD-10-CM

## 2024-11-05 PROCEDURE — 93880 EXTRACRANIAL BILAT STUDY: CPT | Performed by: RADIOLOGY

## 2024-11-05 PROCEDURE — 36415 COLL VENOUS BLD VENIPUNCTURE: CPT

## 2024-11-05 PROCEDURE — 86618 LYME DISEASE ANTIBODY: CPT

## 2024-11-05 PROCEDURE — 70450 CT HEAD/BRAIN W/O DYE: CPT | Performed by: RADIOLOGY

## 2024-11-05 PROCEDURE — 70450 CT HEAD/BRAIN W/O DYE: CPT

## 2024-11-05 PROCEDURE — 93880 EXTRACRANIAL BILAT STUDY: CPT

## 2024-11-08 LAB — B BURGDOR.VLSE1+PEPC10 AB SER IA-ACNC: 0.11 IV

## 2025-02-07 ENCOUNTER — APPOINTMENT (OUTPATIENT)
Dept: RADIOLOGY | Facility: HOSPITAL | Age: 75
End: 2025-02-07
Payer: MEDICARE

## 2025-02-07 ENCOUNTER — HOSPITAL ENCOUNTER (EMERGENCY)
Facility: HOSPITAL | Age: 75
Discharge: HOME | End: 2025-02-07
Payer: MEDICARE

## 2025-02-07 ENCOUNTER — APPOINTMENT (OUTPATIENT)
Dept: CARDIOLOGY | Facility: HOSPITAL | Age: 75
End: 2025-02-07
Payer: MEDICARE

## 2025-02-07 VITALS
TEMPERATURE: 97.9 F | HEART RATE: 57 BPM | RESPIRATION RATE: 18 BRPM | DIASTOLIC BLOOD PRESSURE: 60 MMHG | SYSTOLIC BLOOD PRESSURE: 107 MMHG | OXYGEN SATURATION: 98 % | BODY MASS INDEX: 24.99 KG/M2 | HEIGHT: 61 IN

## 2025-02-07 DIAGNOSIS — J44.9 CHRONIC OBSTRUCTIVE PULMONARY DISEASE, UNSPECIFIED COPD TYPE (MULTI): Primary | ICD-10-CM

## 2025-02-07 DIAGNOSIS — M54.41 ACUTE RIGHT-SIDED LOW BACK PAIN WITH RIGHT-SIDED SCIATICA: ICD-10-CM

## 2025-02-07 LAB
ALBUMIN SERPL BCP-MCNC: 4.7 G/DL (ref 3.4–5)
ALP SERPL-CCNC: 61 U/L (ref 33–136)
ALT SERPL W P-5'-P-CCNC: 20 U/L (ref 7–45)
ANION GAP BLDV CALCULATED.4IONS-SCNC: 12 MMOL/L (ref 10–25)
ANION GAP SERPL CALCULATED.3IONS-SCNC: 14 MMOL/L (ref 10–20)
APPEARANCE UR: CLEAR
AST SERPL W P-5'-P-CCNC: 29 U/L (ref 9–39)
BASE EXCESS BLDV CALC-SCNC: 2 MMOL/L (ref -2–3)
BASOPHILS # BLD AUTO: 0.06 X10*3/UL (ref 0–0.1)
BASOPHILS NFR BLD AUTO: 0.5 %
BILIRUB SERPL-MCNC: 1.6 MG/DL (ref 0–1.2)
BILIRUB UR STRIP.AUTO-MCNC: NEGATIVE MG/DL
BNP SERPL-MCNC: 96 PG/ML (ref 0–99)
BODY TEMPERATURE: 37 DEGREES CELSIUS
BUN SERPL-MCNC: 14 MG/DL (ref 6–23)
CA-I BLDV-SCNC: 1.26 MMOL/L (ref 1.1–1.33)
CALCIUM SERPL-MCNC: 9.6 MG/DL (ref 8.6–10.3)
CARDIAC TROPONIN I PNL SERPL HS: 7 NG/L (ref 0–13)
CHLORIDE BLDV-SCNC: 101 MMOL/L (ref 98–107)
CHLORIDE SERPL-SCNC: 104 MMOL/L (ref 98–107)
CO2 SERPL-SCNC: 25 MMOL/L (ref 21–32)
COLOR UR: YELLOW
CREAT SERPL-MCNC: 0.63 MG/DL (ref 0.5–1.05)
D DIMER PPP FEU-MCNC: 0.29 MG/L FEU (ref 0.19–0.5)
EGFRCR SERPLBLD CKD-EPI 2021: >90 ML/MIN/1.73M*2
EOSINOPHIL # BLD AUTO: 0.18 X10*3/UL (ref 0–0.4)
EOSINOPHIL NFR BLD AUTO: 1.4 %
ERYTHROCYTE [DISTWIDTH] IN BLOOD BY AUTOMATED COUNT: 13 % (ref 11.5–14.5)
FLUAV RNA RESP QL NAA+PROBE: NOT DETECTED
FLUBV RNA RESP QL NAA+PROBE: NOT DETECTED
GLUCOSE BLDV-MCNC: 94 MG/DL (ref 74–99)
GLUCOSE SERPL-MCNC: 89 MG/DL (ref 74–99)
GLUCOSE UR STRIP.AUTO-MCNC: NORMAL MG/DL
HCO3 BLDV-SCNC: 27.8 MMOL/L (ref 22–26)
HCT VFR BLD AUTO: 53.2 % (ref 36–46)
HCT VFR BLD EST: 53 % (ref 36–46)
HGB BLD-MCNC: 17 G/DL (ref 12–16)
HGB BLDV-MCNC: 17.5 G/DL (ref 12–16)
IMM GRANULOCYTES # BLD AUTO: 0.04 X10*3/UL (ref 0–0.5)
IMM GRANULOCYTES NFR BLD AUTO: 0.3 % (ref 0–0.9)
INHALED O2 CONCENTRATION: 100 %
KETONES UR STRIP.AUTO-MCNC: ABNORMAL MG/DL
LACTATE BLDV-SCNC: 1.4 MMOL/L (ref 0.4–2)
LEUKOCYTE ESTERASE UR QL STRIP.AUTO: NEGATIVE
LYMPHOCYTES # BLD AUTO: 1.32 X10*3/UL (ref 0.8–3)
LYMPHOCYTES NFR BLD AUTO: 10.2 %
MAGNESIUM SERPL-MCNC: 1.96 MG/DL (ref 1.6–2.4)
MCH RBC QN AUTO: 30.5 PG (ref 26–34)
MCHC RBC AUTO-ENTMCNC: 32 G/DL (ref 32–36)
MCV RBC AUTO: 95 FL (ref 80–100)
MONOCYTES # BLD AUTO: 0.34 X10*3/UL (ref 0.05–0.8)
MONOCYTES NFR BLD AUTO: 2.6 %
NEUTROPHILS # BLD AUTO: 11.04 X10*3/UL (ref 1.6–5.5)
NEUTROPHILS NFR BLD AUTO: 85 %
NITRITE UR QL STRIP.AUTO: NEGATIVE
NRBC BLD-RTO: 0 /100 WBCS (ref 0–0)
OXYHGB MFR BLDV: 29.5 % (ref 45–75)
PCO2 BLDV: 46 MM HG (ref 41–51)
PH BLDV: 7.39 PH (ref 7.33–7.43)
PH UR STRIP.AUTO: 5.5 [PH]
PLATELET # BLD AUTO: 296 X10*3/UL (ref 150–450)
PO2 BLDV: 20 MM HG (ref 35–45)
POTASSIUM BLDV-SCNC: 3.9 MMOL/L (ref 3.5–5.3)
POTASSIUM SERPL-SCNC: 3.9 MMOL/L (ref 3.5–5.3)
PROT SERPL-MCNC: 7.4 G/DL (ref 6.4–8.2)
PROT UR STRIP.AUTO-MCNC: NEGATIVE MG/DL
RBC # BLD AUTO: 5.58 X10*6/UL (ref 4–5.2)
RBC # UR STRIP.AUTO: NEGATIVE MG/DL
RSV RNA RESP QL NAA+PROBE: NOT DETECTED
SAO2 % BLDV: 30 % (ref 45–75)
SARS-COV-2 RNA RESP QL NAA+PROBE: NOT DETECTED
SODIUM BLDV-SCNC: 137 MMOL/L (ref 136–145)
SODIUM SERPL-SCNC: 139 MMOL/L (ref 136–145)
SP GR UR STRIP.AUTO: 1.02
UROBILINOGEN UR STRIP.AUTO-MCNC: NORMAL MG/DL
WBC # BLD AUTO: 13 X10*3/UL (ref 4.4–11.3)

## 2025-02-07 PROCEDURE — 84132 ASSAY OF SERUM POTASSIUM: CPT | Mod: 59

## 2025-02-07 PROCEDURE — 72170 X-RAY EXAM OF PELVIS: CPT | Performed by: RADIOLOGY

## 2025-02-07 PROCEDURE — 84132 ASSAY OF SERUM POTASSIUM: CPT

## 2025-02-07 PROCEDURE — 83735 ASSAY OF MAGNESIUM: CPT

## 2025-02-07 PROCEDURE — 2500000004 HC RX 250 GENERAL PHARMACY W/ HCPCS (ALT 636 FOR OP/ED)

## 2025-02-07 PROCEDURE — 96372 THER/PROPH/DIAG INJ SC/IM: CPT

## 2025-02-07 PROCEDURE — 72170 X-RAY EXAM OF PELVIS: CPT

## 2025-02-07 PROCEDURE — 36415 COLL VENOUS BLD VENIPUNCTURE: CPT

## 2025-02-07 PROCEDURE — 84295 ASSAY OF SERUM SODIUM: CPT

## 2025-02-07 PROCEDURE — 70450 CT HEAD/BRAIN W/O DYE: CPT

## 2025-02-07 PROCEDURE — 83880 ASSAY OF NATRIURETIC PEPTIDE: CPT

## 2025-02-07 PROCEDURE — 72110 X-RAY EXAM L-2 SPINE 4/>VWS: CPT | Performed by: RADIOLOGY

## 2025-02-07 PROCEDURE — 93005 ELECTROCARDIOGRAM TRACING: CPT

## 2025-02-07 PROCEDURE — 85300 ANTITHROMBIN III ACTIVITY: CPT

## 2025-02-07 PROCEDURE — 71045 X-RAY EXAM CHEST 1 VIEW: CPT

## 2025-02-07 PROCEDURE — 70450 CT HEAD/BRAIN W/O DYE: CPT | Performed by: RADIOLOGY

## 2025-02-07 PROCEDURE — 82330 ASSAY OF CALCIUM: CPT

## 2025-02-07 PROCEDURE — 87637 SARSCOV2&INF A&B&RSV AMP PRB: CPT

## 2025-02-07 PROCEDURE — 96360 HYDRATION IV INFUSION INIT: CPT

## 2025-02-07 PROCEDURE — 2500000002 HC RX 250 W HCPCS SELF ADMINISTERED DRUGS (ALT 637 FOR MEDICARE OP, ALT 636 FOR OP/ED)

## 2025-02-07 PROCEDURE — 84484 ASSAY OF TROPONIN QUANT: CPT

## 2025-02-07 PROCEDURE — 99285 EMERGENCY DEPT VISIT HI MDM: CPT | Mod: 25

## 2025-02-07 PROCEDURE — 85025 COMPLETE CBC W/AUTO DIFF WBC: CPT

## 2025-02-07 PROCEDURE — 81003 URINALYSIS AUTO W/O SCOPE: CPT

## 2025-02-07 PROCEDURE — 94640 AIRWAY INHALATION TREATMENT: CPT

## 2025-02-07 PROCEDURE — 72110 X-RAY EXAM L-2 SPINE 4/>VWS: CPT

## 2025-02-07 PROCEDURE — 71045 X-RAY EXAM CHEST 1 VIEW: CPT | Performed by: RADIOLOGY

## 2025-02-07 RX ORDER — IBUPROFEN 800 MG/1
800 TABLET ORAL 3 TIMES DAILY
Qty: 21 TABLET | Refills: 0 | Status: SHIPPED | OUTPATIENT
Start: 2025-02-07 | End: 2025-02-07

## 2025-02-07 RX ORDER — KETOROLAC TROMETHAMINE 30 MG/ML
30 INJECTION, SOLUTION INTRAMUSCULAR; INTRAVENOUS ONCE
Status: COMPLETED | OUTPATIENT
Start: 2025-02-07 | End: 2025-02-07

## 2025-02-07 RX ORDER — METHYLPREDNISOLONE 4 MG/1
TABLET ORAL
Qty: 21 TABLET | Refills: 0 | Status: SHIPPED | OUTPATIENT
Start: 2025-02-07 | End: 2025-02-13

## 2025-02-07 RX ORDER — ALBUTEROL SULFATE 90 UG/1
2 INHALANT RESPIRATORY (INHALATION) EVERY 4 HOURS PRN
Qty: 18 G | Refills: 0 | Status: SHIPPED | OUTPATIENT
Start: 2025-02-07 | End: 2025-02-07

## 2025-02-07 RX ORDER — TRAMADOL HYDROCHLORIDE 50 MG/1
50 TABLET ORAL EVERY 6 HOURS PRN
Qty: 12 TABLET | Refills: 0 | Status: SHIPPED | OUTPATIENT
Start: 2025-02-07 | End: 2025-02-07

## 2025-02-07 RX ORDER — TRAMADOL HYDROCHLORIDE 50 MG/1
50 TABLET ORAL EVERY 6 HOURS PRN
Qty: 12 TABLET | Refills: 0 | Status: SHIPPED | OUTPATIENT
Start: 2025-02-07 | End: 2025-02-10

## 2025-02-07 RX ORDER — IBUPROFEN 800 MG/1
800 TABLET ORAL 3 TIMES DAILY
Qty: 21 TABLET | Refills: 0 | Status: SHIPPED | OUTPATIENT
Start: 2025-02-07 | End: 2025-02-14

## 2025-02-07 RX ORDER — CYCLOBENZAPRINE HCL 10 MG
10 TABLET ORAL 2 TIMES DAILY PRN
Qty: 20 TABLET | Refills: 0 | Status: SHIPPED | OUTPATIENT
Start: 2025-02-07 | End: 2025-02-07

## 2025-02-07 RX ORDER — METHYLPREDNISOLONE 4 MG/1
TABLET ORAL
Qty: 21 TABLET | Refills: 0 | Status: SHIPPED | OUTPATIENT
Start: 2025-02-07 | End: 2025-02-07

## 2025-02-07 RX ORDER — CYCLOBENZAPRINE HCL 10 MG
10 TABLET ORAL 2 TIMES DAILY PRN
Qty: 20 TABLET | Refills: 0 | Status: SHIPPED | OUTPATIENT
Start: 2025-02-07 | End: 2025-02-17

## 2025-02-07 RX ORDER — ORPHENADRINE CITRATE 30 MG/ML
60 INJECTION INTRAMUSCULAR; INTRAVENOUS ONCE
Status: COMPLETED | OUTPATIENT
Start: 2025-02-07 | End: 2025-02-07

## 2025-02-07 RX ORDER — IPRATROPIUM BROMIDE AND ALBUTEROL SULFATE 2.5; .5 MG/3ML; MG/3ML
3 SOLUTION RESPIRATORY (INHALATION) ONCE
Status: COMPLETED | OUTPATIENT
Start: 2025-02-07 | End: 2025-02-07

## 2025-02-07 RX ORDER — ALBUTEROL SULFATE 90 UG/1
2 INHALANT RESPIRATORY (INHALATION) EVERY 4 HOURS PRN
Qty: 18 G | Refills: 0 | Status: SHIPPED | OUTPATIENT
Start: 2025-02-07 | End: 2025-03-09

## 2025-02-07 RX ADMIN — KETOROLAC TROMETHAMINE 30 MG: 30 INJECTION, SOLUTION INTRAMUSCULAR at 14:25

## 2025-02-07 RX ADMIN — ORPHENADRINE CITRATE 60 MG: 60 INJECTION INTRAMUSCULAR; INTRAVENOUS at 14:25

## 2025-02-07 RX ADMIN — SODIUM CHLORIDE 1000 ML: 9 INJECTION, SOLUTION INTRAVENOUS at 16:16

## 2025-02-07 RX ADMIN — IPRATROPIUM BROMIDE AND ALBUTEROL SULFATE 3 ML: 2.5; .5 SOLUTION RESPIRATORY (INHALATION) at 14:25

## 2025-02-07 RX ADMIN — METHYLPREDNISOLONE SODIUM SUCCINATE 125 MG: 125 INJECTION, POWDER, FOR SOLUTION INTRAMUSCULAR; INTRAVENOUS at 14:25

## 2025-02-07 ASSESSMENT — PAIN - FUNCTIONAL ASSESSMENT
PAIN_FUNCTIONAL_ASSESSMENT: 0-10
PAIN_FUNCTIONAL_ASSESSMENT: 0-10

## 2025-02-07 ASSESSMENT — PAIN DESCRIPTION - FREQUENCY: FREQUENCY: CONSTANT/CONTINUOUS

## 2025-02-07 ASSESSMENT — PAIN SCALES - GENERAL
PAINLEVEL_OUTOF10: 3
PAINLEVEL_OUTOF10: 2
PAINLEVEL_OUTOF10: 3

## 2025-02-07 ASSESSMENT — PAIN DESCRIPTION - LOCATION: LOCATION: THROAT

## 2025-02-07 ASSESSMENT — PAIN DESCRIPTION - DESCRIPTORS: DESCRIPTORS: SORE

## 2025-02-08 NOTE — ED PROVIDER NOTES
HPI   Chief Complaint   Patient presents with    Flu Symptoms     Started a few weeks ago, was recently exposed to pneumonia. Sore throat, productive cough, slight sob. (Hx of copd and emphysema )        HPI  Patient is a 74-year-old female who presents to ED for flulike symptoms that started a few weeks ago.  Patient states she has been exposed to people with pneumonia.  She complains of a mild sore throat, productive cough, very mild shortness of breath, patient does have a history of COPD and emphysema, does not wear oxygen at baseline.  Denies any chest pain, abdominal pain, NVD, urinary symptoms, fever or chills.  Denies any recent hospitalizations or surgeries.  Denies any recent travel or sick contacts.  Patient is on Brilinta, no other anticoagulation, she does have a history of CAD.      Patient History   Past Medical History:   Diagnosis Date    COPD (chronic obstructive pulmonary disease) (Multi)      Past Surgical History:   Procedure Laterality Date    CARDIAC CATHETERIZATION N/A 2024    Procedure: Left Heart Cath, No LV;  Surgeon: Addison Reaves MD;  Location: [facility] Cardiac Cath Lab;  Service: Cardiovascular;  Laterality: N/A;  no pre auth required    OTHER SURGICAL HISTORY  2022    Appendectomy    OTHER SURGICAL HISTORY  2022     section    OTHER SURGICAL HISTORY  2022    Hernia repair    OTHER SURGICAL HISTORY  2022    Wrist surgery     No family history on file.  Social History     Tobacco Use    Smoking status: Former     Current packs/day: 0.00     Types: Cigarettes     Quit date:      Years since quittin.1    Smokeless tobacco: Never   Substance Use Topics    Alcohol use: Not on file    Drug use: Not on file       Physical Exam   ED Triage Vitals [25 1350]   Temperature Heart Rate Respirations BP   36.6 °C (97.9 °F) 55 17 137/85      Pulse Ox Temp Source Heart Rate Source Patient Position   (!) 93 % Temporal Monitor Sitting      BP Location  FiO2 (%)     Right arm --       Physical Exam  Vitals reviewed.   Constitutional:       General: She is not in acute distress.     Appearance: Normal appearance. She is not ill-appearing.   HENT:      Head: Normocephalic and atraumatic.      Nose: No congestion or rhinorrhea.      Mouth/Throat:      Mouth: Mucous membranes are moist.      Pharynx: No oropharyngeal exudate or posterior oropharyngeal erythema.   Eyes:      Extraocular Movements: Extraocular movements intact.      Pupils: Pupils are equal, round, and reactive to light.   Cardiovascular:      Rate and Rhythm: Normal rate and regular rhythm.      Heart sounds: Normal heart sounds.   Pulmonary:      Effort: Pulmonary effort is normal.      Breath sounds: Wheezing (Mild expiratory wheezing) present.   Abdominal:      General: Abdomen is flat.      Palpations: Abdomen is soft.      Tenderness: There is no abdominal tenderness.   Musculoskeletal:         General: Normal range of motion.      Cervical back: Normal range of motion and neck supple.   Skin:     General: Skin is warm and dry.   Neurological:      General: No focal deficit present.      Mental Status: She is alert and oriented to person, place, and time.   Psychiatric:         Mood and Affect: Mood normal.         Behavior: Behavior normal.           ED Course & MDM   ED Course as of 02/07/25 2136 Fri Feb 07, 2025   1610 EKG interpreted by myself independently, EKG shows sinus rhythm, rate 71 bpm, parable 166, QRS 88, , QTc 465, patient had no ST elevation or depression, negative for acute MI. [GENNARO]      ED Course User Index  [GENNARO] Jake Negro DO         Diagnoses as of 02/07/25 2136   Chronic obstructive pulmonary disease, unspecified COPD type (Multi)   Acute right-sided low back pain with right-sided sciatica                 No data recorded     Steffanie Coma Scale Score: 15 (02/07/25 1412 : Shivani Chambers RN)                           Medical Decision Making  Parts of this chart have  been completed using voice recognition software. Please excuse any errors of transcription.  My thought process and reason for plan has been formulated from the time that I saw the patient until the time of disposition and is not specific to one specific moment during their visit and furthermore my MDM encompasses this entire chart and not only this text box.    HPI:   A medically appropriate HPI was obtained, outlined above.    Merary Montana is a  74 y.o. female    Chief Complaint   Patient presents with    Flu Symptoms     Started a few weeks ago, was recently exposed to pneumonia. Sore throat, productive cough, slight sob. (Hx of copd and emphysema )        Past Medical History:   Diagnosis Date    COPD (chronic obstructive pulmonary disease) (Multi)        Past Surgical History:   Procedure Laterality Date    CARDIAC CATHETERIZATION N/A 2024    Procedure: Left Heart Cath, No LV;  Surgeon: Addison Reaves MD;  Location: [facility] Cardiac Cath Lab;  Service: Cardiovascular;  Laterality: N/A;  no pre auth required    OTHER SURGICAL HISTORY  2022    Appendectomy    OTHER SURGICAL HISTORY  2022     section    OTHER SURGICAL HISTORY  2022    Hernia repair    OTHER SURGICAL HISTORY  2022    Wrist surgery       Social History     Tobacco Use    Smoking status: Former     Current packs/day: 0.00     Types: Cigarettes     Quit date:      Years since quittin.    Smokeless tobacco: Never       No family history on file.    Allergies   Allergen Reactions    Augmentin [Amoxicillin-Pot Clavulanate] Nausea/vomiting    Dilaudid [Hydromorphone] Nausea/vomiting    Zofran [Ondansetron Hcl] Nausea/vomiting       Current Outpatient Medications   Medication Instructions    albuterol 90 mcg/actuation inhaler 2 puffs, inhalation, Every 4 hours PRN    albuterol 90 mcg/actuation inhaler 2 puffs, inhalation, Every 4 hours PRN    albuterol 90 mcg/actuation inhaler 2 puffs, inhalation, Every 4 hours  "PRN    aspirin 81 mg, oral, Daily    atorvastatin (LIPITOR) 40 mg, oral, Daily    cyclobenzaprine (FLEXERIL) 10 mg, oral, 2 times daily PRN    ergocalciferol (VITAMIN D-2) 1.25 mg, oral, Once Weekly    estradiol (ESTRACE) 2 g, vaginal, Daily    fluticasone (Flonase) 50 mcg/actuation nasal spray 1 spray, Each Nostril, Daily, Shake gently. Before first use, prime pump. After use, clean tip and replace cap.    ibandronate (BONIVA) 150 mg, oral, Every 30 days, Take in morning with full glass of water on an empty stomach. No food, drink, meds, or lying down for 60 minutes after.    ibuprofen 800 mg, oral, 3 times daily    magnesium oxide (MAG-OX) 400 mg, Daily    methylPREDNISolone (Medrol Dospak) 4 mg tablets Follow schedule on package instructions    nitroglycerin (NITROSTAT) 0.3 mg, sublingual, Every 5 min PRN    nystatin (Mycostatin) ointment Topical, 2 times daily    pantoprazole (PROTONIX) 20 mg, oral, Daily before breakfast, Do not crush, chew, or split.    promethazine (PHENERGAN) 25 mg, oral, Every 6 hours PRN    ticagrelor (Brilinta) 90 mg tablet Take 1 tablet by mouth twice a day    traMADol (ULTRAM) 50 mg, oral, Every 6 hours PRN    triamterene-hydrochlorothiazid (Dyazide) 37.5-25 mg capsule 1 capsule, oral, Every morning   for details    Exam:   Patient Vitals for the past 24 hrs:   BP Temp Temp src Pulse Resp SpO2 Height   02/07/25 1552 107/60 -- -- 57 18 98 % --   02/07/25 1542 -- -- -- 55 18 95 % --   02/07/25 1350 137/85 36.6 °C (97.9 °F) Temporal 55 17 (!) 93 % 1.549 m (5' 1\")       A medically appropriate exam performed, outlined above, given the known history and presentation.    EKG/Cardiac monitor:   If EKG was done and, it was interpreted by attending physician, see their note for ED course for more detail.    Medications given during visit:  Medications   ipratropium-albuteroL (Duo-Neb) 0.5-2.5 mg/3 mL nebulizer solution 3 mL (3 mL nebulization Given 2/7/25 9878)   methylPREDNISolone sod succinate " (SOLU-Medrol) injection 125 mg (125 mg intramuscular Given 2/7/25 1425)   ketorolac (Toradol) injection 30 mg (30 mg intramuscular Given 2/7/25 1425)   orphenadrine (Norflex) injection 60 mg (60 mg intramuscular Given 2/7/25 1425)   sodium chloride 0.9 % bolus 1,000 mL (0 mL intravenous Stopped 2/7/25 1746)        Diagnostic/tests:  Labs Reviewed   CBC WITH AUTO DIFFERENTIAL - Abnormal       Result Value    WBC 13.0 (*)     nRBC 0.0      RBC 5.58 (*)     Hemoglobin 17.0 (*)     Hematocrit 53.2 (*)     MCV 95      MCH 30.5      MCHC 32.0      RDW 13.0      Platelets 296      Neutrophils % 85.0      Immature Granulocytes %, Automated 0.3      Lymphocytes % 10.2      Monocytes % 2.6      Eosinophils % 1.4      Basophils % 0.5      Neutrophils Absolute 11.04 (*)     Immature Granulocytes Absolute, Automated 0.04      Lymphocytes Absolute 1.32      Monocytes Absolute 0.34      Eosinophils Absolute 0.18      Basophils Absolute 0.06     COMPREHENSIVE METABOLIC PANEL - Abnormal    Glucose 89      Sodium 139      Potassium 3.9      Chloride 104      Bicarbonate 25      Anion Gap 14      Urea Nitrogen 14      Creatinine 0.63      eGFR >90      Calcium 9.6      Albumin 4.7      Alkaline Phosphatase 61      Total Protein 7.4      AST 29      Bilirubin, Total 1.6 (*)     ALT 20     BLOOD GAS VENOUS FULL PANEL - Abnormal    POCT pH, Venous 7.39      POCT pCO2, Venous 46      POCT pO2, Venous 20 (*)     POCT SO2, Venous 30 (*)     POCT Oxy Hemoglobin, Venous 29.5 (*)     POCT Hematocrit Calculated, Venous 53.0 (*)     POCT Sodium, Venous 137      POCT Potassium, Venous 3.9      POCT Chloride, Venous 101      POCT Ionized Calicum, Venous 1.26      POCT Glucose, Venous 94      POCT Lactate, Venous 1.4      POCT Base Excess, Venous 2.0      POCT HCO3 Calculated, Venous 27.8 (*)     POCT Hemoglobin, Venous 17.5 (*)     POCT Anion Gap, Venous 12.0      Patient Temperature 37.0      FiO2 100     URINALYSIS WITH REFLEX CULTURE AND  MICROSCOPIC - Abnormal    Color, Urine Yellow      Appearance, Urine Clear      Specific Gravity, Urine 1.020      pH, Urine 5.5      Protein, Urine NEGATIVE      Glucose, Urine Normal      Blood, Urine NEGATIVE      Ketones, Urine 10 (1+) (*)     Bilirubin, Urine NEGATIVE      Urobilinogen, Urine Normal      Nitrite, Urine NEGATIVE      Leukocyte Esterase, Urine NEGATIVE     SARS-COV-2 AND INFLUENZA A/B PCR - Normal    Flu A Result Not Detected      Flu B Result Not Detected      Coronavirus 2019, PCR Not Detected      Narrative:     This assay is an FDA-cleared, in vitro diagnostic nucleic acid amplification test for the qualitative detection and differentiation of SARS CoV-2/ Influenza A/B from nasopharyngeal specimens collected from individuals with signs and symptoms of respiratory tract infections, and has been validated for use at [facility]. Negative results do not preclude COVID-19/ Influenza A/B infections and should not be used as the sole basis for diagnosis, treatment, or other management decisions. Testing for SARS CoV-2 is recommended only for patients who meet current clinical and/or epidemiological criteria defined by federal, state, or local public health directives.   RSV PCR - Normal    RSV PCR Not Detected      Narrative:     This assay is an FDA-cleared, in vitro diagnostic nucleic acid amplification test for the detection of RSV from nasopharyngeal specimens, and has been validated for use at [facility]. Negative results do not preclude RSV infections, and should not be used as the sole basis for diagnosis, treatment, or other management decisions. If Influenza A/B and RSV PCR results are negative, testing for Parainfluenza virus, Adenovirus and Metapneumovirus is routinely performed for pediatric oncology and intensive care inpatients at [facility], and is available on other patients by placing an add-on request.       MAGNESIUM - Normal    Magnesium  1.96     B-TYPE NATRIURETIC PEPTIDE - Normal    BNP 96      Narrative:        <100 pg/mL - Heart failure unlikely  100-299 pg/mL - Intermediate probability of acute heart                  failure exacerbation. Correlate with clinical                  context and patient history.    >=300 pg/mL - Heart Failure likely. Correlate with clinical                  context and patient history.    BNP testing is performed using different testing methodology at [facility] than at other [facility]. Direct result comparisons should only be made within the same method.      D-DIMER, NON VTE - Normal    D-Dimer Non VTE, Quant (mg/L FEU) 0.29      Narrative:     THROMBOEMBOLIC EVENTS CANNOT BE EXCLUDED SOLELY ON THE BASIS OF THE D-DIMER LEVEL BEING WITHIN THE NORMAL REFERENCE RANGE. D-DIMER LEVELS LESS THAN 0.5 MG/L FEU IN CONJUNCTION WITH A LOW CLINICAL PROBABILITY HAVE AN EXCELLENT NEGATIVE PREDICTIVE VALUE IN EXCLUDING A DIAGNOSIS OF PULMONARY EMBOLUS (PE) OR DEEP VEIN THROMBOSIS (DVT). ELEVATED D-DIMER LEVELS ARE NOT SPECIFIC TO PE OR DVT, AND MAY BE SEEN IN PATIENTS WITH DIC, ADVANCED AGE, PREGNANCY, MALIGNANCY, LIVER DISEASE, INFECTION, AND INFLAMMATORY CONDITIONS AMONG OTHERS. D-DIMER LEVELS MAY BE DECREASED IN PATIENTS RECEIVING ANTI-COAGULATION THERAPY.   SERIAL TROPONIN-INITIAL - Normal    Troponin I, High Sensitivity 7      Narrative:     Less than 99th percentile of normal range cutoff-  Female and children under 18 years old <14 ng/L; Male <21 ng/L: Negative  Repeat testing should be performed if clinically indicated.     Female and children under 18 years old 14-50 ng/L; Male 21-50 ng/L:  Consistent with possible cardiac damage and possible increased clinical   risk. Serial measurements may help to assess extent of myocardial damage.     >50 ng/L: Consistent with cardiac damage, increased clinical risk and  myocardial infarction. Serial measurements may help assess extent of   myocardial damage.       NOTE: Children less than 1 year old may have higher baseline troponin   levels and results should be interpreted in conjunction with the overall   clinical context.     NOTE: Troponin I testing is performed using a different   testing methodology at [facility]] than at other   [facility]. Direct result comparisons should only   be made within the same method.   URINALYSIS WITH REFLEX CULTURE AND MICROSCOPIC    Narrative:     The following orders were created for panel order Urinalysis with Reflex Culture and Microscopic.  Procedure                               Abnormality         Status                     ---------                               -----------         ------                     Urinalysis with Reflex C...[[id number]]  Abnormal            Final result               Extra Urine Gray Tube[[id number]]                                                         Please view results for these tests on the individual orders.   TROPONIN SERIES- (INITIAL, 1 HR)    Narrative:     The following orders were created for panel order Troponin I Series, High Sensitivity (0, 1 HR).  Procedure                               Abnormality         Status                     ---------                               -----------         ------                     Troponin I, High Sensiti...[[id number]]  Normal              Final result               Troponin, High Sensitivi...[[id number]]                                                   Please view results for these tests on the individual orders.   EXTRA URINE GRAY TUBE   SERIAL TROPONIN, 1 HOUR        CT head wo IV contrast   Final Result   No evidence of acute cortical infarct or intracranial hemorrhage.        Senescent changes.        MACRO:   None        Signed by: Tino Martinez 2/7/2025 4:43 PM   Dictation workstation:   [id number]      XR chest 1 view   Final Result   1.  No evidence of acute cardiopulmonary process.                  MACRO:   None         Signed by: Nick Hooks 2/7/2025 3:14 PM   Dictation workstation:   [id number]      XR pelvis 1-2 views   Final Result   No acute fracture or dislocation.             Signed by: Nick Hooks 2/7/2025 3:13 PM   Dictation workstation:   [id number]      XR lumbar spine complete 4+ views   Final Result   No definite acute findings. Considerable multilevel degenerative   change as detailed above.             MACRO:   None        Signed by: Joseph Schoenberger 2/7/2025 3:12 PM   Dictation workstation:   [id number]             [facility] Summary:  Patient has a mild leukocytosis of 13.0 with left shift.  VBG is unremarkable.  CMP shows no electrolyte abnormality, acute kidney injury or transaminitis.  Troponin, BMP, magnesium are all within normal limits.  No evidence of urinary tract infection.  Patient states that she is feeling somewhat better after medications.  She is stable for discharge at this time.    We have discussed the diagnosis and risks, and we agree with discharging home to follow-up with appropriate physician as directed. We also discussed returning to the Emergency Department immediately if new or worsening symptoms occur. We have discussed the symptoms which are most concerning that necessitate immediate return. Pt symptoms have been well controlled here and the patient is safe for discharge with appropriate outpatient follow up. The patient has verbalized understanding to return to ER without delay for new or worsening pains or for any other symptoms or concerns. I utilized the discharge clinical management tool provided [employer] to help estimate risk of negative outcome for this patient.      Disposition:  ED Prescriptions       Medication Sig Dispense Start Date End Date Auth. Provider    ibuprofen 800 mg tablet  (Status: Discontinued) Take 1 tablet (800 mg) by mouth 3 times a day for 7 days. 21 tablet 2/7/2025 2/7/2025 Ez Downing PA-C    cyclobenzaprine (Flexeril) 10 mg tablet   (Status: Discontinued) Take 1 tablet (10 mg) by mouth 2 times a day as needed for muscle spasms for up to 10 days. 20 tablet 2/7/2025 2/7/2025 Ez Downing PA-C    traMADol (Ultram) 50 mg tablet  (Status: Discontinued) Take 1 tablet (50 mg) by mouth every 6 hours if needed for severe pain (7 - 10) for up to 3 days. 12 tablet 2/7/2025 2/7/2025 Ez Downing PA-C    methylPREDNISolone (Medrol Dospak) 4 mg tablets  (Status: Discontinued) Follow schedule on package instructions 21 tablet 2/7/2025 2/7/2025 Ez Downing PA-C    albuterol 90 mcg/actuation inhaler  (Status: Discontinued) Inhale 2 puffs every 4 hours if needed for wheezing. 18 g 2/7/2025 2/7/2025 Ez Downing PA-C    albuterol 90 mcg/actuation inhaler Inhale 2 puffs every 4 hours if needed for wheezing. 18 g 2/7/2025 3/9/2025 Ez Downing PA-C    cyclobenzaprine (Flexeril) 10 mg tablet Take 1 tablet (10 mg) by mouth 2 times a day as needed for muscle spasms for up to 10 days. 20 tablet 2/7/2025 2/17/2025 Ez Downing PA-C    ibuprofen 800 mg tablet Take 1 tablet (800 mg) by mouth 3 times a day for 7 days. 21 tablet 2/7/2025 2/14/2025 Ez Downing PA-C    methylPREDNISolone (Medrol Dospak) 4 mg tablets Follow schedule on package instructions 21 tablet 2/7/2025 2/13/2025 Ez Downing PA-C    traMADol (Ultram) 50 mg tablet Take 1 tablet (50 mg) by mouth every 6 hours if needed for severe pain (7 - 10) for up to 3 days. 12 tablet 2/7/2025 2/10/2025 Ez Downing PA-C              Procedure  Procedures     Ez Downing PA-C  02/08/25 1253

## 2025-02-10 LAB
ATRIAL RATE: 71 BPM
P AXIS: 72 DEGREES
P OFFSET: 193 MS
P ONSET: 141 MS
PR INTERVAL: 166 MS
Q ONSET: 224 MS
QRS COUNT: 12 BEATS
QRS DURATION: 88 MS
QT INTERVAL: 428 MS
QTC CALCULATION(BAZETT): 465 MS
QTC FREDERICIA: 452 MS
R AXIS: 53 DEGREES
T AXIS: 61 DEGREES
T OFFSET: 438 MS
VENTRICULAR RATE: 71 BPM

## 2025-02-12 ENCOUNTER — HOSPITAL ENCOUNTER (EMERGENCY)
Facility: HOSPITAL | Age: 75
Discharge: HOME | End: 2025-02-12
Payer: MEDICARE

## 2025-02-12 ENCOUNTER — APPOINTMENT (OUTPATIENT)
Dept: RADIOLOGY | Facility: HOSPITAL | Age: 75
End: 2025-02-12
Payer: MEDICARE

## 2025-02-12 VITALS
BODY MASS INDEX: 24.27 KG/M2 | HEART RATE: 87 BPM | TEMPERATURE: 98.2 F | SYSTOLIC BLOOD PRESSURE: 134 MMHG | HEIGHT: 61 IN | WEIGHT: 128.53 LBS | RESPIRATION RATE: 18 BRPM | DIASTOLIC BLOOD PRESSURE: 72 MMHG | OXYGEN SATURATION: 95 %

## 2025-02-12 DIAGNOSIS — R06.00 DYSPNEA, UNSPECIFIED TYPE: ICD-10-CM

## 2025-02-12 DIAGNOSIS — J18.9 ATYPICAL PNEUMONIA: Primary | ICD-10-CM

## 2025-02-12 DIAGNOSIS — R05.1 ACUTE COUGH: ICD-10-CM

## 2025-02-12 DIAGNOSIS — J02.9 SORE THROAT: ICD-10-CM

## 2025-02-12 LAB
FLUAV RNA RESP QL NAA+PROBE: NOT DETECTED
FLUBV RNA RESP QL NAA+PROBE: NOT DETECTED
RSV RNA RESP QL NAA+PROBE: NOT DETECTED
SARS-COV-2 RNA RESP QL NAA+PROBE: NOT DETECTED

## 2025-02-12 PROCEDURE — 71046 X-RAY EXAM CHEST 2 VIEWS: CPT

## 2025-02-12 PROCEDURE — 2500000001 HC RX 250 WO HCPCS SELF ADMINISTERED DRUGS (ALT 637 FOR MEDICARE OP): Performed by: PHYSICIAN ASSISTANT

## 2025-02-12 PROCEDURE — 2500000002 HC RX 250 W HCPCS SELF ADMINISTERED DRUGS (ALT 637 FOR MEDICARE OP, ALT 636 FOR OP/ED): Performed by: PHYSICIAN ASSISTANT

## 2025-02-12 PROCEDURE — 71046 X-RAY EXAM CHEST 2 VIEWS: CPT | Mod: FOREIGN READ | Performed by: RADIOLOGY

## 2025-02-12 PROCEDURE — 87636 SARSCOV2 & INF A&B AMP PRB: CPT | Performed by: PHYSICIAN ASSISTANT

## 2025-02-12 PROCEDURE — 99284 EMERGENCY DEPT VISIT MOD MDM: CPT | Mod: 25

## 2025-02-12 PROCEDURE — 2500000004 HC RX 250 GENERAL PHARMACY W/ HCPCS (ALT 636 FOR OP/ED): Performed by: PHYSICIAN ASSISTANT

## 2025-02-12 PROCEDURE — 94640 AIRWAY INHALATION TREATMENT: CPT

## 2025-02-12 RX ORDER — DOXYCYCLINE 100 MG/1
100 CAPSULE ORAL 2 TIMES DAILY
Qty: 20 CAPSULE | Refills: 0 | Status: SHIPPED | OUTPATIENT
Start: 2025-02-12 | End: 2025-02-12

## 2025-02-12 RX ORDER — ALBUTEROL SULFATE 90 UG/1
2 INHALANT RESPIRATORY (INHALATION) EVERY 4 HOURS PRN
Qty: 18 G | Refills: 0 | Status: SHIPPED | OUTPATIENT
Start: 2025-02-12 | End: 2025-02-12

## 2025-02-12 RX ORDER — ALBUTEROL SULFATE 90 UG/1
2 INHALANT RESPIRATORY (INHALATION) EVERY 4 HOURS PRN
Qty: 18 G | Refills: 0 | Status: SHIPPED | OUTPATIENT
Start: 2025-02-12 | End: 2025-03-14

## 2025-02-12 RX ORDER — BENZONATATE 100 MG/1
100 CAPSULE ORAL EVERY 8 HOURS
Qty: 21 CAPSULE | Refills: 0 | Status: SHIPPED | OUTPATIENT
Start: 2025-02-12 | End: 2025-02-12

## 2025-02-12 RX ORDER — DOXYCYCLINE 100 MG/1
100 CAPSULE ORAL 2 TIMES DAILY
Qty: 20 CAPSULE | Refills: 0 | Status: SHIPPED | OUTPATIENT
Start: 2025-02-12 | End: 2025-02-22

## 2025-02-12 RX ORDER — ACETAMINOPHEN 325 MG/1
975 TABLET ORAL ONCE
Status: COMPLETED | OUTPATIENT
Start: 2025-02-12 | End: 2025-02-12

## 2025-02-12 RX ORDER — DOXYCYCLINE 100 MG/1
100 CAPSULE ORAL ONCE
Status: COMPLETED | OUTPATIENT
Start: 2025-02-12 | End: 2025-02-12

## 2025-02-12 RX ORDER — IPRATROPIUM BROMIDE AND ALBUTEROL SULFATE 2.5; .5 MG/3ML; MG/3ML
3 SOLUTION RESPIRATORY (INHALATION) ONCE
Status: COMPLETED | OUTPATIENT
Start: 2025-02-12 | End: 2025-02-12

## 2025-02-12 RX ORDER — BENZONATATE 100 MG/1
100 CAPSULE ORAL EVERY 8 HOURS
Qty: 21 CAPSULE | Refills: 0 | Status: SHIPPED | OUTPATIENT
Start: 2025-02-12 | End: 2025-02-19

## 2025-02-12 RX ADMIN — DEXAMETHASONE 10 MG: 6 TABLET ORAL at 14:49

## 2025-02-12 RX ADMIN — ACETAMINOPHEN 975 MG: 325 TABLET, FILM COATED ORAL at 14:49

## 2025-02-12 RX ADMIN — IPRATROPIUM BROMIDE AND ALBUTEROL SULFATE 3 ML: 2.5; .5 SOLUTION RESPIRATORY (INHALATION) at 14:49

## 2025-02-12 RX ADMIN — DOXYCYCLINE HYCLATE 100 MG: 100 CAPSULE ORAL at 14:49

## 2025-02-12 ASSESSMENT — COLUMBIA-SUICIDE SEVERITY RATING SCALE - C-SSRS
1. IN THE PAST MONTH, HAVE YOU WISHED YOU WERE DEAD OR WISHED YOU COULD GO TO SLEEP AND NOT WAKE UP?: NO
2. HAVE YOU ACTUALLY HAD ANY THOUGHTS OF KILLING YOURSELF?: NO
6. HAVE YOU EVER DONE ANYTHING, STARTED TO DO ANYTHING, OR PREPARED TO DO ANYTHING TO END YOUR LIFE?: NO

## 2025-02-12 ASSESSMENT — PAIN SCALES - GENERAL
PAINLEVEL_OUTOF10: 3
PAINLEVEL_OUTOF10: 9

## 2025-02-12 ASSESSMENT — PAIN - FUNCTIONAL ASSESSMENT
PAIN_FUNCTIONAL_ASSESSMENT: 0-10
PAIN_FUNCTIONAL_ASSESSMENT: 0-10

## 2025-02-12 NOTE — ED PROVIDER NOTES
HPI   Chief Complaint   Patient presents with    Sore Throat     Pt complains of worsening sore throat and cough.  Was seen on Friday. Negative flu, covid, strep.       74-year-old female with a history of COPD presented emergency department with a chief complaint of cough, sore throat.  She was seen in the emergency department several days ago.  Had a negative cardiac workup at that time.  Was placed on steroid.  Patient is concerned that may be secondary bacterial infection and is requesting antibiotic.  She denies any chest discomfort.  She is afebrile nontoxic well-appearing nontoxic not hypoxic.  Denies abdominal pain dysuria diarrhea.  Denies lower extremity edema.  Denies injury or trauma.  No other complaint.              Patient History   Past Medical History:   Diagnosis Date    COPD (chronic obstructive pulmonary disease) (Multi)      Past Surgical History:   Procedure Laterality Date    CARDIAC CATHETERIZATION N/A 2024    Procedure: Left Heart Cath, No LV;  Surgeon: Addison Reaves MD;  Location: [facility] Cardiac [facility] Lab;  Service: Cardiovascular;  Laterality: N/A;  no pre auth required    OTHER SURGICAL HISTORY  2022    Appendectomy    OTHER SURGICAL HISTORY  2022     section    OTHER SURGICAL HISTORY  2022    Hernia repair    OTHER SURGICAL HISTORY  2022    Wrist surgery     No family history on file.  Social History     Tobacco Use    Smoking status: Former     Current packs/day: 0.00     Types: Cigarettes     Quit date:      Years since quittin.    Smokeless tobacco: Never   Substance Use Topics    Alcohol use: Not on file    Drug use: Not on file       Physical Exam   ED Triage Vitals [25 1422]   Temperature Heart Rate Respirations BP   36.8 °C (98.2 °F) 87 18 134/72      Pulse Ox Temp src Heart Rate Source Patient Position   95 % -- -- --      BP Location FiO2 (%)     -- --       Physical Exam  Vitals and nursing note reviewed.   Constitutional:        Appearance: She is well-developed.   HENT:      Head: Normocephalic.      Mouth/Throat:      Mouth: Mucous membranes are moist.      Tonsils: No tonsillar exudate or tonsillar abscesses.      Comments: Mild posterior pharynx edema, no tonsillar exudate, patent airway  Cardiovascular:      Rate and Rhythm: Normal rate and regular rhythm.      Heart sounds: Normal heart sounds.   Pulmonary:      Effort: Pulmonary effort is normal.      Breath sounds: Normal breath sounds.   Abdominal:      General: Bowel sounds are normal.      Palpations: Abdomen is soft.   Musculoskeletal:      Cervical back: Normal range of motion and neck supple.   Skin:     General: Skin is warm and dry.   Neurological:      General: No focal deficit present.      Mental Status: She is alert and oriented to person, place, and time.   Psychiatric:         Mood and Affect: Mood normal.           ED Course & MDM   Diagnoses as of 02/12/25 1541   Atypical pneumonia   Sore throat   Acute cough   Dyspnea, unspecified type                 No data recorded     Steffanie Coma Scale Score: 15 (02/12/25 1422 : Edin Rinaldi, EMT)                           Medical Decision Making  I have seen and evaluated this patient.  Physician available for consultation.  Vital signs have been reviewed.  All laboratory and diagnostic imaging is reviewed by myself and interpreted by myself unless otherwise stated.  Additionally imaging is interpreted by radiologist.    Negative viral testing, negative chest x-ray.   Cover for atypical pneumonia given patient's continued cough sore throat shortness of breath in the setting of COPD.    Labs Reviewed  SARS-COV-2 AND INFLUENZA A/B PCR - Normal     Flu A Result                                         Flu B Result                                         Coronavirus 2019, PCR                                  Narrative: This assay is an FDA-cleared, in vitro diagnostic nucleic acid amplification test for the qualitative  detection and differentiation of SARS CoV-2/ Influenza A/B from nasopharyngeal specimens collected from individuals with signs and symptoms of respiratory tract infections, and has been validated for use at [facility]. Negative results do not preclude COVID-19/ Influenza A/B infections and should not be used as the sole basis for diagnosis, treatment, or other management decisions. Testing for SARS CoV-2 is recommended only for patients who meet current clinical and/or epidemiological criteria defined by federal, state, or local public health directives.  RSV PCR - Normal  XR chest 2 views   Final Result    COPD.    Signed by James Castano, DO     Medications  doxycycline (Vibramycin) capsule 100 mg (100 mg oral Given 2/12/25 1449)  dexAMETHasone (Decadron) tablet 10 mg (10 mg oral Given 2/12/25 1449)  acetaminophen (Tylenol) tablet 975 mg (975 mg oral Given 2/12/25 1449)  ipratropium-albuteroL (Duo-Neb) 0.5-2.5 mg/3 mL nebulizer solution 3 mL (3 mL nebulization Given 2/12/25 1449)  Discharge Medication List as of 2/12/2025  3:42 PM    START taking these medications    benzonatate (Tessalon) 100 mg capsule  Take 1 capsule (100 mg) by mouth every 8 hours for 7 days. Do not crush or chew., Starting Wed 2/12/2025, Until Wed 2/19/2025, Normal    doxycycline (Vibramycin) 100 mg capsule  Take 1 capsule (100 mg) by mouth 2 times a day for 10 days. Take with at least 8 ounces (large glass) of water, do not lie down for 30 minutes after, Starting Wed 2/12/2025, Until Sat 2/22/2025, Normal                  Procedure  Procedures     Damian Cotton PA-C  02/12/25 7740

## 2025-03-25 ENCOUNTER — HOSPITAL ENCOUNTER (OUTPATIENT)
Dept: RADIOLOGY | Facility: HOSPITAL | Age: 75
Discharge: HOME | End: 2025-03-25
Payer: MEDICARE

## 2025-03-25 DIAGNOSIS — N20.0 CALCULUS OF KIDNEY: ICD-10-CM

## 2025-03-25 PROCEDURE — 74018 RADEX ABDOMEN 1 VIEW: CPT

## 2025-03-25 PROCEDURE — 74018 RADEX ABDOMEN 1 VIEW: CPT | Performed by: RADIOLOGY

## (undated) DEVICE — CATHETER, EXPO, MODEL-D, 6FR FL4

## (undated) DEVICE — PACK, ANGIO P2, CUSTOM, LAKE

## (undated) DEVICE — CATHETER, EXPO, MODEL-D, 6FR FR4

## (undated) DEVICE — CATHETER, EXPO, MODEL-D, 6FR PIG 110CM

## (undated) DEVICE — CATHETER, GUIDE, RUNWAY, JL4, 6FR X 100CM, NO SIDE HOLES, LT

## (undated) DEVICE — KIT, NAMIC STANDARD LEFT HEART, CUSTOM, LWMC

## (undated) DEVICE — GUIDEWIRE, J -TIP, CHOICE FLOPPY, .014 X 182CM

## (undated) DEVICE — CATHETER, ANGIO, EXPO, WRP, 6 FR X 100 CM

## (undated) DEVICE — CATHETER, BALLOON DILATION, EUPHORA SEMICOMPLIANT 3.00  X 15 MM X 142CM

## (undated) DEVICE — CLOSURE DEVICE, VASCULAR, ANGIO-SEAL, VIP, 6FR, LF

## (undated) DEVICE — INTRODUCER, SHEATH, AVANTI PLUS, W/MINI WIRE, STANDARD, 6MS FR, 11 CM

## (undated) DEVICE — GUIDEWIRE, J TIP, 3 MM, 0.035 IN X 150 CM, PTFE

## (undated) DEVICE — COVER, EQUIPMENT, ZERO GRAVITY